# Patient Record
Sex: FEMALE | Race: WHITE | NOT HISPANIC OR LATINO | Employment: OTHER | URBAN - METROPOLITAN AREA
[De-identification: names, ages, dates, MRNs, and addresses within clinical notes are randomized per-mention and may not be internally consistent; named-entity substitution may affect disease eponyms.]

---

## 2023-06-30 LAB
ALANINE AMINOTRANSFERASE (SGPT) (U/L) IN SER/PLAS: 15 U/L (ref 7–45)
ALBUMIN (G/DL) IN SER/PLAS: 3.8 G/DL (ref 3.4–5)
ALBUMIN (MG/L) IN URINE: <7 MG/L
ALBUMIN/CREATININE (UG/MG) IN URINE: NORMAL UG/MG CRT (ref 0–30)
ALKALINE PHOSPHATASE (U/L) IN SER/PLAS: 91 U/L (ref 33–136)
ANION GAP IN SER/PLAS: 12 MMOL/L (ref 10–20)
ASPARTATE AMINOTRANSFERASE (SGOT) (U/L) IN SER/PLAS: 14 U/L (ref 9–39)
BILIRUBIN TOTAL (MG/DL) IN SER/PLAS: 0.5 MG/DL (ref 0–1.2)
CALCIUM (MG/DL) IN SER/PLAS: 8.8 MG/DL (ref 8.6–10.3)
CARBON DIOXIDE, TOTAL (MMOL/L) IN SER/PLAS: 31 MMOL/L (ref 21–32)
CHLORIDE (MMOL/L) IN SER/PLAS: 101 MMOL/L (ref 98–107)
CHOLESTEROL (MG/DL) IN SER/PLAS: 144 MG/DL (ref 0–199)
CHOLESTEROL IN HDL (MG/DL) IN SER/PLAS: 59.2 MG/DL
CHOLESTEROL/HDL RATIO: 2.4
CREATININE (MG/DL) IN SER/PLAS: 0.94 MG/DL (ref 0.5–1.05)
CREATININE (MG/DL) IN URINE: 49.5 MG/DL (ref 20–320)
GFR FEMALE: 66 ML/MIN/1.73M2
GLUCOSE (MG/DL) IN SER/PLAS: 87 MG/DL (ref 74–99)
LDL: 62 MG/DL (ref 0–99)
POTASSIUM (MMOL/L) IN SER/PLAS: 4.1 MMOL/L (ref 3.5–5.3)
PROTEIN TOTAL: 6.6 G/DL (ref 6.4–8.2)
SODIUM (MMOL/L) IN SER/PLAS: 140 MMOL/L (ref 136–145)
TRIGLYCERIDE (MG/DL) IN SER/PLAS: 116 MG/DL (ref 0–149)
UREA NITROGEN (MG/DL) IN SER/PLAS: 10 MG/DL (ref 6–23)
VLDL: 23 MG/DL (ref 0–40)

## 2023-07-01 LAB
ESTIMATED AVERAGE GLUCOSE FOR HBA1C: 143 MG/DL
HEMOGLOBIN A1C/HEMOGLOBIN TOTAL IN BLOOD: 6.6 %

## 2023-07-31 PROBLEM — E66.01 MORBID OBESITY (MULTI): Status: ACTIVE | Noted: 2023-07-31

## 2023-07-31 PROBLEM — I25.10 ATHEROSCLEROSIS OF NATIVE CORONARY ARTERY OF NATIVE HEART WITHOUT ANGINA PECTORIS: Status: ACTIVE | Noted: 2023-07-31

## 2023-07-31 PROBLEM — R93.1 ELEVATED CORONARY ARTERY CALCIUM SCORE: Status: ACTIVE | Noted: 2023-07-31

## 2023-07-31 PROBLEM — M79.18 MYOFASCIAL PAIN SYNDROME: Status: ACTIVE | Noted: 2023-07-31

## 2023-07-31 PROBLEM — M53.3 PAIN OF BOTH SACROILIAC JOINTS: Status: ACTIVE | Noted: 2023-07-31

## 2023-07-31 PROBLEM — I73.9 PVD (PERIPHERAL VASCULAR DISEASE) (CMS-HCC): Status: ACTIVE | Noted: 2023-07-31

## 2023-07-31 PROBLEM — M54.9 CHRONIC BACK PAIN GREATER THAN 3 MONTHS DURATION: Status: ACTIVE | Noted: 2023-07-31

## 2023-07-31 PROBLEM — I83.93 ASYMPTOMATIC VARICOSE VEINS OF BOTH LOWER EXTREMITIES: Status: ACTIVE | Noted: 2023-07-31

## 2023-07-31 PROBLEM — E53.8 VITAMIN B 12 DEFICIENCY: Status: ACTIVE | Noted: 2023-07-31

## 2023-07-31 PROBLEM — E55.9 VITAMIN D DEFICIENCY: Status: ACTIVE | Noted: 2023-07-31

## 2023-07-31 PROBLEM — E11.9 TYPE 2 DIABETES MELLITUS WITHOUT COMPLICATION, WITHOUT LONG-TERM CURRENT USE OF INSULIN (MULTI): Status: ACTIVE | Noted: 2023-07-31

## 2023-07-31 PROBLEM — G47.00 INSOMNIA: Status: ACTIVE | Noted: 2023-07-31

## 2023-07-31 PROBLEM — F32.0 MILD MAJOR DEPRESSION (CMS-HCC): Status: ACTIVE | Noted: 2023-07-31

## 2023-07-31 PROBLEM — I10 BENIGN ESSENTIAL HYPERTENSION: Status: ACTIVE | Noted: 2023-07-31

## 2023-07-31 PROBLEM — R93.1 ABNORMAL SCREENING CARDIAC CT: Status: ACTIVE | Noted: 2023-07-31

## 2023-07-31 PROBLEM — E66.09 OBESITY DUE TO EXCESS CALORIES, UNSPECIFIED OBESITY SEVERITY: Status: ACTIVE | Noted: 2023-07-31

## 2023-07-31 PROBLEM — F43.21 GRIEF: Status: ACTIVE | Noted: 2023-07-31

## 2023-07-31 PROBLEM — G89.29 CHRONIC BACK PAIN GREATER THAN 3 MONTHS DURATION: Status: ACTIVE | Noted: 2023-07-31

## 2023-07-31 PROBLEM — R11.0 NAUSEA IN ADULT: Status: ACTIVE | Noted: 2023-07-31

## 2023-07-31 PROBLEM — E03.9 HYPOTHYROIDISM IN ADULT: Status: ACTIVE | Noted: 2023-07-31

## 2023-07-31 PROBLEM — F41.9 ANXIETY: Status: ACTIVE | Noted: 2023-07-31

## 2023-07-31 PROBLEM — E78.5 HYPERLIPIDEMIA ASSOCIATED WITH TYPE 2 DIABETES MELLITUS (MULTI): Status: ACTIVE | Noted: 2023-07-31

## 2023-07-31 PROBLEM — R00.2 PALPITATIONS: Status: ACTIVE | Noted: 2023-07-31

## 2023-07-31 PROBLEM — E11.69 HYPERLIPIDEMIA ASSOCIATED WITH TYPE 2 DIABETES MELLITUS (MULTI): Status: ACTIVE | Noted: 2023-07-31

## 2023-07-31 RX ORDER — METFORMIN HYDROCHLORIDE 750 MG/1
750 TABLET, EXTENDED RELEASE ORAL DAILY
COMMUNITY
End: 2023-08-04 | Stop reason: SDUPTHER

## 2023-07-31 RX ORDER — OMEPRAZOLE 40 MG/1
1 CAPSULE, DELAYED RELEASE ORAL DAILY
COMMUNITY
Start: 2016-09-01 | End: 2024-01-26 | Stop reason: SDUPTHER

## 2023-07-31 RX ORDER — BUPROPION HYDROCHLORIDE 150 MG/1
150 TABLET ORAL EVERY 24 HOURS
COMMUNITY
End: 2023-08-04 | Stop reason: ALTCHOICE

## 2023-07-31 RX ORDER — TIRZEPATIDE 2.5 MG/.5ML
INJECTION, SOLUTION SUBCUTANEOUS
COMMUNITY
Start: 2023-07-05 | End: 2023-08-04 | Stop reason: ALTCHOICE

## 2023-07-31 RX ORDER — BLOOD SUGAR DIAGNOSTIC
STRIP MISCELLANEOUS
COMMUNITY

## 2023-07-31 RX ORDER — NAPROXEN SODIUM 220 MG/1
1 TABLET, FILM COATED ORAL DAILY
COMMUNITY
Start: 2020-10-07

## 2023-07-31 RX ORDER — ATORVASTATIN CALCIUM 40 MG/1
40 TABLET, FILM COATED ORAL NIGHTLY
COMMUNITY
End: 2023-08-04 | Stop reason: SDUPTHER

## 2023-07-31 RX ORDER — CARVEDILOL 12.5 MG/1
1 TABLET ORAL
COMMUNITY
Start: 2017-07-27 | End: 2024-04-02 | Stop reason: SDUPTHER

## 2023-07-31 RX ORDER — HYDROCHLOROTHIAZIDE 12.5 MG/1
12.5 TABLET ORAL DAILY
COMMUNITY
End: 2023-08-04 | Stop reason: SDUPTHER

## 2023-07-31 RX ORDER — LOSARTAN POTASSIUM 100 MG/1
100 TABLET ORAL DAILY
COMMUNITY
End: 2023-09-07 | Stop reason: SDUPTHER

## 2023-07-31 RX ORDER — POTASSIUM CHLORIDE 1500 MG/1
20 TABLET, EXTENDED RELEASE ORAL DAILY
COMMUNITY
End: 2023-08-04 | Stop reason: ALTCHOICE

## 2023-07-31 RX ORDER — BUPROPION HYDROCHLORIDE 300 MG/1
40 TABLET ORAL DAILY
COMMUNITY
End: 2023-08-04 | Stop reason: SDUPTHER

## 2023-07-31 RX ORDER — BUSPIRONE HYDROCHLORIDE 7.5 MG/1
7.5 TABLET ORAL AS NEEDED
COMMUNITY
Start: 2023-01-27 | End: 2024-04-01 | Stop reason: SDUPTHER

## 2023-07-31 RX ORDER — ACETAMINOPHEN 500 MG
1 TABLET ORAL DAILY
COMMUNITY
Start: 2017-07-27 | End: 2023-08-04 | Stop reason: SDUPTHER

## 2023-08-01 LAB
CALCIDIOL (25 OH VITAMIN D3) (NG/ML) IN SER/PLAS: 78 NG/ML
COBALAMIN (VITAMIN B12) (PG/ML) IN SER/PLAS: 183 PG/ML (ref 211–911)
ERYTHROCYTE DISTRIBUTION WIDTH (RATIO) BY AUTOMATED COUNT: 14.6 % (ref 11.5–14.5)
ERYTHROCYTE MEAN CORPUSCULAR HEMOGLOBIN CONCENTRATION (G/DL) BY AUTOMATED: 32.2 G/DL (ref 32–36)
ERYTHROCYTE MEAN CORPUSCULAR VOLUME (FL) BY AUTOMATED COUNT: 89 FL (ref 80–100)
ERYTHROCYTES (10*6/UL) IN BLOOD BY AUTOMATED COUNT: 4.06 X10E12/L (ref 4–5.2)
HEMATOCRIT (%) IN BLOOD BY AUTOMATED COUNT: 36.3 % (ref 36–46)
HEMOGLOBIN (G/DL) IN BLOOD: 11.7 G/DL (ref 12–16)
LEUKOCYTES (10*3/UL) IN BLOOD BY AUTOMATED COUNT: 6.1 X10E9/L (ref 4.4–11.3)
PLATELETS (10*3/UL) IN BLOOD AUTOMATED COUNT: 325 X10E9/L (ref 150–450)
THYROTROPIN (MIU/L) IN SER/PLAS BY DETECTION LIMIT <= 0.05 MIU/L: 8.28 MIU/L (ref 0.44–3.98)
THYROXINE (T4) FREE (NG/DL) IN SER/PLAS: 1.06 NG/DL (ref 0.61–1.12)

## 2023-08-04 ENCOUNTER — OFFICE VISIT (OUTPATIENT)
Dept: PRIMARY CARE | Facility: CLINIC | Age: 68
End: 2023-08-04
Payer: MEDICARE

## 2023-08-04 VITALS
DIASTOLIC BLOOD PRESSURE: 68 MMHG | BODY MASS INDEX: 43 KG/M2 | HEART RATE: 76 BPM | SYSTOLIC BLOOD PRESSURE: 102 MMHG | WEIGHT: 274 LBS | HEIGHT: 67 IN

## 2023-08-04 DIAGNOSIS — I73.9 PVD (PERIPHERAL VASCULAR DISEASE) (CMS-HCC): ICD-10-CM

## 2023-08-04 DIAGNOSIS — E66.01 MORBID OBESITY (MULTI): ICD-10-CM

## 2023-08-04 DIAGNOSIS — E53.8 VITAMIN B 12 DEFICIENCY: ICD-10-CM

## 2023-08-04 DIAGNOSIS — E03.9 HYPOTHYROIDISM IN ADULT: Primary | ICD-10-CM

## 2023-08-04 DIAGNOSIS — I10 BENIGN ESSENTIAL HYPERTENSION: ICD-10-CM

## 2023-08-04 DIAGNOSIS — Z23 NEED FOR PNEUMOCOCCAL 20-VALENT CONJUGATE VACCINATION: ICD-10-CM

## 2023-08-04 DIAGNOSIS — E11.69 HYPERLIPIDEMIA ASSOCIATED WITH TYPE 2 DIABETES MELLITUS (MULTI): ICD-10-CM

## 2023-08-04 DIAGNOSIS — F32.0 MILD MAJOR DEPRESSION (CMS-HCC): ICD-10-CM

## 2023-08-04 DIAGNOSIS — F41.9 ANXIETY: ICD-10-CM

## 2023-08-04 DIAGNOSIS — E11.9 TYPE 2 DIABETES MELLITUS WITHOUT COMPLICATION, WITHOUT LONG-TERM CURRENT USE OF INSULIN (MULTI): ICD-10-CM

## 2023-08-04 DIAGNOSIS — R11.0 NAUSEA: ICD-10-CM

## 2023-08-04 DIAGNOSIS — E55.9 VITAMIN D DEFICIENCY: ICD-10-CM

## 2023-08-04 DIAGNOSIS — E78.5 HYPERLIPIDEMIA ASSOCIATED WITH TYPE 2 DIABETES MELLITUS (MULTI): ICD-10-CM

## 2023-08-04 PROBLEM — E66.09 OBESITY DUE TO EXCESS CALORIES, UNSPECIFIED OBESITY SEVERITY: Status: RESOLVED | Noted: 2023-07-31 | Resolved: 2023-08-04

## 2023-08-04 PROCEDURE — 1036F TOBACCO NON-USER: CPT | Performed by: CLINICAL NURSE SPECIALIST

## 2023-08-04 PROCEDURE — 3008F BODY MASS INDEX DOCD: CPT | Performed by: CLINICAL NURSE SPECIALIST

## 2023-08-04 PROCEDURE — G0009 ADMIN PNEUMOCOCCAL VACCINE: HCPCS | Performed by: CLINICAL NURSE SPECIALIST

## 2023-08-04 PROCEDURE — 3078F DIAST BP <80 MM HG: CPT | Performed by: CLINICAL NURSE SPECIALIST

## 2023-08-04 PROCEDURE — 1160F RVW MEDS BY RX/DR IN RCRD: CPT | Performed by: CLINICAL NURSE SPECIALIST

## 2023-08-04 PROCEDURE — 4010F ACE/ARB THERAPY RXD/TAKEN: CPT | Performed by: CLINICAL NURSE SPECIALIST

## 2023-08-04 PROCEDURE — 96372 THER/PROPH/DIAG INJ SC/IM: CPT | Performed by: CLINICAL NURSE SPECIALIST

## 2023-08-04 PROCEDURE — 99214 OFFICE O/P EST MOD 30 MIN: CPT | Performed by: CLINICAL NURSE SPECIALIST

## 2023-08-04 PROCEDURE — 1159F MED LIST DOCD IN RCRD: CPT | Performed by: CLINICAL NURSE SPECIALIST

## 2023-08-04 PROCEDURE — 3044F HG A1C LEVEL LT 7.0%: CPT | Performed by: CLINICAL NURSE SPECIALIST

## 2023-08-04 PROCEDURE — 90677 PCV20 VACCINE IM: CPT | Performed by: CLINICAL NURSE SPECIALIST

## 2023-08-04 PROCEDURE — 3074F SYST BP LT 130 MM HG: CPT | Performed by: CLINICAL NURSE SPECIALIST

## 2023-08-04 PROCEDURE — 1157F ADVNC CARE PLAN IN RCRD: CPT | Performed by: CLINICAL NURSE SPECIALIST

## 2023-08-04 RX ORDER — LEVOTHYROXINE SODIUM 137 UG/1
137 TABLET ORAL DAILY
Qty: 30 TABLET | Refills: 11 | Status: SHIPPED | OUTPATIENT
Start: 2023-08-04 | End: 2023-11-07 | Stop reason: SDUPTHER

## 2023-08-04 RX ORDER — CYANOCOBALAMIN 1000 UG/ML
1000 INJECTION, SOLUTION INTRAMUSCULAR; SUBCUTANEOUS ONCE
Status: COMPLETED | OUTPATIENT
Start: 2023-08-04 | End: 2023-08-04

## 2023-08-04 RX ORDER — BUPROPION HYDROCHLORIDE 300 MG/1
300 TABLET ORAL DAILY
Qty: 90 TABLET | Refills: 1 | Status: SHIPPED | OUTPATIENT
Start: 2023-08-04 | End: 2023-11-07 | Stop reason: SDUPTHER

## 2023-08-04 RX ORDER — ONDANSETRON 4 MG/1
4 TABLET, FILM COATED ORAL EVERY 8 HOURS PRN
Qty: 20 TABLET | Refills: 0 | Status: SHIPPED | OUTPATIENT
Start: 2023-08-04 | End: 2023-08-11

## 2023-08-04 RX ORDER — METFORMIN HYDROCHLORIDE 750 MG/1
750 TABLET, EXTENDED RELEASE ORAL DAILY
Qty: 90 TABLET | Refills: 1 | Status: SHIPPED | OUTPATIENT
Start: 2023-08-04 | End: 2024-02-28 | Stop reason: SDUPTHER

## 2023-08-04 RX ORDER — ACETAMINOPHEN 500 MG
5000 TABLET ORAL DAILY
Qty: 90 TABLET | Refills: 1 | Status: SHIPPED | OUTPATIENT
Start: 2023-08-04 | End: 2024-01-31

## 2023-08-04 RX ORDER — HYDROCHLOROTHIAZIDE 12.5 MG/1
12.5 TABLET ORAL DAILY
Qty: 90 TABLET | Refills: 1 | Status: SHIPPED | OUTPATIENT
Start: 2023-08-04 | End: 2024-02-05 | Stop reason: SDUPTHER

## 2023-08-04 RX ORDER — ATORVASTATIN CALCIUM 40 MG/1
40 TABLET, FILM COATED ORAL NIGHTLY
Qty: 90 TABLET | Refills: 1 | Status: SHIPPED | OUTPATIENT
Start: 2023-08-04 | End: 2024-01-26 | Stop reason: SDUPTHER

## 2023-08-04 RX ORDER — DULAGLUTIDE 0.75 MG/.5ML
0.75 INJECTION, SOLUTION SUBCUTANEOUS
Qty: 2 ML | Refills: 1 | Status: SHIPPED | OUTPATIENT
Start: 2023-08-04 | End: 2023-11-01 | Stop reason: SDUPTHER

## 2023-08-04 RX ADMIN — CYANOCOBALAMIN 1000 MCG: 1000 INJECTION, SOLUTION INTRAMUSCULAR; SUBCUTANEOUS at 11:29

## 2023-08-04 ASSESSMENT — ENCOUNTER SYMPTOMS
CONFUSION: 0
PALPITATIONS: 0
ACTIVITY CHANGE: 0
POLYDIPSIA: 0
ARTHRALGIAS: 0
SORE THROAT: 0
DIARRHEA: 0
VOMITING: 0
LOSS OF SENSATION IN FEET: 0
FATIGUE: 0
JOINT SWELLING: 0
CHILLS: 0
TROUBLE SWALLOWING: 0
ABDOMINAL PAIN: 0
CHEST TIGHTNESS: 0
BRUISES/BLEEDS EASILY: 0
HEMATURIA: 0
DIZZINESS: 0
COUGH: 0
SLEEP DISTURBANCE: 0
DYSURIA: 0
NECK PAIN: 0
FEVER: 0
HEADACHES: 0
EYE PAIN: 0
OCCASIONAL FEELINGS OF UNSTEADINESS: 0
MYALGIAS: 0
SHORTNESS OF BREATH: 0
FLANK PAIN: 0
NAUSEA: 0
PHOTOPHOBIA: 0
SEIZURES: 0
DEPRESSION: 0
WOUND: 0
BACK PAIN: 0
BLOOD IN STOOL: 0
WHEEZING: 0
UNEXPECTED WEIGHT CHANGE: 0
APPETITE CHANGE: 0
CONSTIPATION: 0

## 2023-08-04 ASSESSMENT — COLUMBIA-SUICIDE SEVERITY RATING SCALE - C-SSRS
2. HAVE YOU ACTUALLY HAD ANY THOUGHTS OF KILLING YOURSELF?: NO
6. HAVE YOU EVER DONE ANYTHING, STARTED TO DO ANYTHING, OR PREPARED TO DO ANYTHING TO END YOUR LIFE?: NO
1. IN THE PAST MONTH, HAVE YOU WISHED YOU WERE DEAD OR WISHED YOU COULD GO TO SLEEP AND NOT WAKE UP?: NO

## 2023-08-04 ASSESSMENT — PATIENT HEALTH QUESTIONNAIRE - PHQ9
1. LITTLE INTEREST OR PLEASURE IN DOING THINGS: NOT AT ALL
SUM OF ALL RESPONSES TO PHQ9 QUESTIONS 1 AND 2: 0
2. FEELING DOWN, DEPRESSED OR HOPELESS: NOT AT ALL

## 2023-08-04 NOTE — PROGRESS NOTES
Subjective   Patient ID: Elizabet Butler is a 67 y.o. female who presents for Follow-up (Follow up).  HPI    Here today as a follow up appointment.     Thyroid has been uncontrolled. Would like to change from the Compounded medication due to cost as it is not controlled at this time.      The patient states she has been doing well with her Type II Diabetes control since the last visit. Insurance did not cover Mounjaro. Did not tolerate Ozempic. Requesting to try Trulicity.     Comorbid Illnesses: hypertension, hyperlipidemia and obesity.   Disease Course and Complications:. there have been no previous episodes of diabetic ketoacidosis. there have been no previous hospitalizations. She has no known diabetic complications. She has no significant interval events.     Following with [facility]. Following with Cardiology.     Increased stressors. Interested in adjusting her medications. Has continued on Amitriptyline and Wellbutrin.   Migraines have been controlled.     Review of Systems   Constitutional:  Negative for activity change, appetite change, chills, fatigue, fever and unexpected weight change.   HENT:  Negative for ear pain, hearing loss, nosebleeds, sore throat, tinnitus and trouble swallowing.    Eyes:  Negative for photophobia, pain and visual disturbance.   Respiratory:  Negative for cough, chest tightness, shortness of breath and wheezing.    Cardiovascular:  Negative for chest pain, palpitations and leg swelling.   Gastrointestinal:  Negative for abdominal pain, blood in stool, constipation, diarrhea, nausea and vomiting.   Endocrine: Negative for cold intolerance, heat intolerance, polydipsia and polyuria.   Genitourinary:  Negative for dysuria, flank pain and hematuria.   Musculoskeletal:  Negative for arthralgias, back pain, joint swelling, myalgias and neck pain.   Skin:  Negative for pallor, rash and wound.   Allergic/Immunologic: Negative for immunocompromised state.   Neurological:  Negative for  dizziness, seizures and headaches.   Hematological:  Does not bruise/bleed easily.   Psychiatric/Behavioral:  Negative for confusion and sleep disturbance.        Objective   Physical Exam  Vitals and nursing note reviewed.   Constitutional:       General: She is not in acute distress.     Appearance: Normal appearance.   HENT:      Head: Normocephalic.      Nose: Nose normal.   Eyes:      Conjunctiva/sclera: Conjunctivae normal.   Neck:      Vascular: No carotid bruit.   Cardiovascular:      Rate and Rhythm: Normal rate and regular rhythm.      Pulses: Normal pulses.      Heart sounds: Normal heart sounds.   Pulmonary:      Effort: Pulmonary effort is normal.      Breath sounds: Normal breath sounds.   Abdominal:      General: Bowel sounds are normal.      Palpations: Abdomen is soft.   Musculoskeletal:         General: Normal range of motion.      Cervical back: Normal range of motion.   Skin:     General: Skin is warm and dry.   Neurological:      Mental Status: She is alert and oriented to person, place, and time. Mental status is at baseline.   Psychiatric:         Mood and Affect: Mood normal.         Behavior: Behavior normal.         Assessment/Plan        Reviewed most recent lab work available.     Hypothyroidism. Start Levothyroxine. Updated lab work ordered.   Abnormal Calcium score. She had normal stress test in the summer of 2017. She has been seen by cardiology and is now on a statin, baby aspirin a beta blocker. She is asymptomatic. Yearly follow-up with cardiology.   PVD: Stable at this time. continue to monitor.   Hypertension. Blood pressures normally well controlled. Will continue current medication. Patient previously discontinued ARB secondary to dizziness.   Insomnia, Anxiety, Depression, History of migraine variant. Doing well continue carvedilol and amitriptyline. Increased Amitriptyline with increased Depression. Continue Wellbutrin. Buspirone. Reevaluate at follow up appointment.    Diabetes mellitus type 2: A1C 6.6%. Did not tolerate Ozempic. Initially continue Metformin, monitor blood sugars to discontinue. Continue to monitor hemoglobin A1c. Urine microalbumin has previously been negative. ARB discontinued by patient as above secondary to dizziness. No diabetic complications. Diabetic eye exam 2022, patient reminded to schedule this years exam. Trulicity.   Mild dyslipidemia. Continue atorvastatin 40 mg daily. Repeat lipid panel yearly is ordered per cardiology.  Obesity: BMI: 42.91. Lifestyle changes as noted above.  Vitamin B12 Deficiency: B12 injections.     No further Pap smears required secondary to age.   DEXA scan May 2019. Would like to hold off on this time.   Normal mammogram January 2023, negative.   Patient refuses colonoscopy.   Negative Cologuard August 2020, repeat in 3 years. Ordered for 2023.   Prevnar 20: August 2023.  Pneumovax: September 2019.   Flu Vaccine: Fall 2022.  Shingrix: October 2020, March 2021.   Medicare Wellness: January 2023.   Discussed Tdap.

## 2023-08-21 ENCOUNTER — TELEPHONE (OUTPATIENT)
Dept: PRIMARY CARE | Facility: CLINIC | Age: 68
End: 2023-08-21
Payer: MEDICARE

## 2023-08-21 ENCOUNTER — PATIENT MESSAGE (OUTPATIENT)
Dept: PRIMARY CARE | Facility: CLINIC | Age: 68
End: 2023-08-21
Payer: MEDICARE

## 2023-08-21 DIAGNOSIS — R11.0 NAUSEA: Primary | ICD-10-CM

## 2023-08-21 NOTE — TELEPHONE ENCOUNTER
[employer] called---diagnosis on order for Cologuard are not covered    Asking if either one of these codes would be okay ?  They will take verbal okay: Ref #[id number]--[facility] # [phone]  Z12.11 Screening Malignant neoplasm colon  Z12.12 Screening Malignant

## 2023-08-22 RX ORDER — ONDANSETRON 4 MG/1
4 TABLET, FILM COATED ORAL EVERY 8 HOURS PRN
Qty: 21 TABLET | Refills: 1 | Status: SHIPPED | OUTPATIENT
Start: 2023-08-22 | End: 2023-08-29

## 2023-09-07 ENCOUNTER — TELEPHONE (OUTPATIENT)
Dept: PRIMARY CARE | Facility: CLINIC | Age: 68
End: 2023-09-07
Payer: MEDICARE

## 2023-09-07 DIAGNOSIS — I10 BENIGN ESSENTIAL HYPERTENSION: ICD-10-CM

## 2023-09-07 RX ORDER — LOSARTAN POTASSIUM 100 MG/1
100 TABLET ORAL DAILY
Qty: 90 TABLET | Refills: 1 | Status: SHIPPED | OUTPATIENT
Start: 2023-09-07 | End: 2024-02-28 | Stop reason: SDUPTHER

## 2023-09-18 LAB — NONINV COLON CA DNA+OCC BLD SCRN STL QL: POSITIVE

## 2023-09-19 ENCOUNTER — TELEPHONE (OUTPATIENT)
Dept: PRIMARY CARE | Facility: CLINIC | Age: 68
End: 2023-09-19
Payer: MEDICARE

## 2023-09-19 DIAGNOSIS — R19.5 POSITIVE COLORECTAL CANCER SCREENING USING COLOGUARD TEST: ICD-10-CM

## 2023-10-13 ENCOUNTER — TELEPHONE (OUTPATIENT)
Dept: PRIMARY CARE | Facility: CLINIC | Age: 68
End: 2023-10-13
Payer: MEDICARE

## 2023-10-13 DIAGNOSIS — F32.A DEPRESSION, UNSPECIFIED DEPRESSION TYPE: ICD-10-CM

## 2023-10-16 RX ORDER — AMITRIPTYLINE HYDROCHLORIDE 10 MG/1
30 TABLET, FILM COATED ORAL NIGHTLY
Qty: 270 TABLET | Refills: 1 | Status: SHIPPED | OUTPATIENT
Start: 2023-10-16 | End: 2024-04-04 | Stop reason: SDUPTHER

## 2023-10-24 DIAGNOSIS — E11.9 TYPE 2 DIABETES MELLITUS WITHOUT COMPLICATION, WITHOUT LONG-TERM CURRENT USE OF INSULIN (MULTI): Primary | ICD-10-CM

## 2023-10-30 ENCOUNTER — LAB (OUTPATIENT)
Dept: LAB | Facility: LAB | Age: 68
End: 2023-10-30
Payer: MEDICARE

## 2023-10-30 DIAGNOSIS — E11.69 HYPERLIPIDEMIA ASSOCIATED WITH TYPE 2 DIABETES MELLITUS (MULTI): ICD-10-CM

## 2023-10-30 DIAGNOSIS — I10 BENIGN ESSENTIAL HYPERTENSION: ICD-10-CM

## 2023-10-30 DIAGNOSIS — I73.9 PVD (PERIPHERAL VASCULAR DISEASE) (CMS-HCC): ICD-10-CM

## 2023-10-30 DIAGNOSIS — E66.01 MORBID OBESITY (MULTI): ICD-10-CM

## 2023-10-30 DIAGNOSIS — E03.9 HYPOTHYROIDISM IN ADULT: ICD-10-CM

## 2023-10-30 DIAGNOSIS — E78.5 HYPERLIPIDEMIA ASSOCIATED WITH TYPE 2 DIABETES MELLITUS (MULTI): ICD-10-CM

## 2023-10-30 DIAGNOSIS — E55.9 VITAMIN D DEFICIENCY: ICD-10-CM

## 2023-10-30 DIAGNOSIS — F32.0 MILD MAJOR DEPRESSION (CMS-HCC): ICD-10-CM

## 2023-10-30 DIAGNOSIS — F41.9 ANXIETY: ICD-10-CM

## 2023-10-30 DIAGNOSIS — E11.9 TYPE 2 DIABETES MELLITUS WITHOUT COMPLICATION, WITHOUT LONG-TERM CURRENT USE OF INSULIN (MULTI): ICD-10-CM

## 2023-10-30 LAB
ALBUMIN SERPL BCP-MCNC: 4.2 G/DL (ref 3.4–5)
ALP SERPL-CCNC: 101 U/L (ref 33–136)
ALT SERPL W P-5'-P-CCNC: 23 U/L (ref 7–45)
ANION GAP SERPL CALC-SCNC: 11 MMOL/L (ref 10–20)
AST SERPL W P-5'-P-CCNC: 18 U/L (ref 9–39)
BILIRUB SERPL-MCNC: 0.6 MG/DL (ref 0–1.2)
BUN SERPL-MCNC: 14 MG/DL (ref 6–23)
CALCIUM SERPL-MCNC: 9.5 MG/DL (ref 8.6–10.3)
CHLORIDE SERPL-SCNC: 99 MMOL/L (ref 98–107)
CHOLEST SERPL-MCNC: 167 MG/DL (ref 0–199)
CHOLESTEROL/HDL RATIO: 2.5
CO2 SERPL-SCNC: 31 MMOL/L (ref 21–32)
CREAT SERPL-MCNC: 1.16 MG/DL (ref 0.5–1.05)
CREAT UR-MCNC: 46.7 MG/DL (ref 20–320)
GFR SERPL CREATININE-BSD FRML MDRD: 51 ML/MIN/1.73M*2
GLUCOSE SERPL-MCNC: 83 MG/DL (ref 74–99)
HDLC SERPL-MCNC: 67.4 MG/DL
LDLC SERPL CALC-MCNC: 82 MG/DL
MICROALBUMIN UR-MCNC: <7 MG/L
MICROALBUMIN/CREAT UR: NORMAL MG/G{CREAT}
NON HDL CHOLESTEROL: 100 MG/DL (ref 0–149)
POTASSIUM SERPL-SCNC: 4.3 MMOL/L (ref 3.5–5.3)
PROT SERPL-MCNC: 7 G/DL (ref 6.4–8.2)
SODIUM SERPL-SCNC: 137 MMOL/L (ref 136–145)
TRIGL SERPL-MCNC: 90 MG/DL (ref 0–149)
TSH SERPL-ACNC: 1.62 MIU/L (ref 0.44–3.98)
VIT B12 SERPL-MCNC: 205 PG/ML (ref 211–911)
VLDL: 18 MG/DL (ref 0–40)

## 2023-10-30 PROCEDURE — 80053 COMPREHEN METABOLIC PANEL: CPT

## 2023-10-30 PROCEDURE — 36415 COLL VENOUS BLD VENIPUNCTURE: CPT

## 2023-10-30 PROCEDURE — 82570 ASSAY OF URINE CREATININE: CPT

## 2023-10-30 PROCEDURE — 84443 ASSAY THYROID STIM HORMONE: CPT

## 2023-10-30 PROCEDURE — 80061 LIPID PANEL: CPT

## 2023-10-30 PROCEDURE — 82043 UR ALBUMIN QUANTITATIVE: CPT

## 2023-10-30 PROCEDURE — 82607 VITAMIN B-12: CPT

## 2023-10-30 PROCEDURE — 83036 HEMOGLOBIN GLYCOSYLATED A1C: CPT

## 2023-10-31 LAB
EST. AVERAGE GLUCOSE BLD GHB EST-MCNC: 128 MG/DL
HBA1C MFR BLD: 6.1 %

## 2023-11-01 ENCOUNTER — OFFICE VISIT (OUTPATIENT)
Dept: CARDIOLOGY | Facility: CLINIC | Age: 68
End: 2023-11-01
Payer: MEDICARE

## 2023-11-01 VITALS
HEART RATE: 80 BPM | DIASTOLIC BLOOD PRESSURE: 85 MMHG | RESPIRATION RATE: 18 BRPM | SYSTOLIC BLOOD PRESSURE: 130 MMHG | BODY MASS INDEX: 41.12 KG/M2 | HEIGHT: 67 IN | OXYGEN SATURATION: 99 % | WEIGHT: 262 LBS

## 2023-11-01 DIAGNOSIS — E78.5 HYPERLIPIDEMIA ASSOCIATED WITH TYPE 2 DIABETES MELLITUS (MULTI): ICD-10-CM

## 2023-11-01 DIAGNOSIS — E66.01 MORBID OBESITY (MULTI): ICD-10-CM

## 2023-11-01 DIAGNOSIS — E11.69 HYPERLIPIDEMIA ASSOCIATED WITH TYPE 2 DIABETES MELLITUS (MULTI): ICD-10-CM

## 2023-11-01 DIAGNOSIS — I10 BENIGN ESSENTIAL HYPERTENSION: ICD-10-CM

## 2023-11-01 DIAGNOSIS — E11.9 TYPE 2 DIABETES MELLITUS WITHOUT COMPLICATION, WITHOUT LONG-TERM CURRENT USE OF INSULIN (MULTI): ICD-10-CM

## 2023-11-01 DIAGNOSIS — R93.1 ELEVATED CORONARY ARTERY CALCIUM SCORE: Primary | ICD-10-CM

## 2023-11-01 PROCEDURE — 3008F BODY MASS INDEX DOCD: CPT | Performed by: INTERNAL MEDICINE

## 2023-11-01 PROCEDURE — 4010F ACE/ARB THERAPY RXD/TAKEN: CPT | Performed by: INTERNAL MEDICINE

## 2023-11-01 PROCEDURE — 99214 OFFICE O/P EST MOD 30 MIN: CPT | Performed by: INTERNAL MEDICINE

## 2023-11-01 PROCEDURE — 1159F MED LIST DOCD IN RCRD: CPT | Performed by: INTERNAL MEDICINE

## 2023-11-01 PROCEDURE — 3075F SYST BP GE 130 - 139MM HG: CPT | Performed by: INTERNAL MEDICINE

## 2023-11-01 PROCEDURE — 3044F HG A1C LEVEL LT 7.0%: CPT | Performed by: INTERNAL MEDICINE

## 2023-11-01 PROCEDURE — 3062F POS MACROALBUMINURIA REV: CPT | Performed by: INTERNAL MEDICINE

## 2023-11-01 PROCEDURE — 3048F LDL-C <100 MG/DL: CPT | Performed by: INTERNAL MEDICINE

## 2023-11-01 PROCEDURE — 1160F RVW MEDS BY RX/DR IN RCRD: CPT | Performed by: INTERNAL MEDICINE

## 2023-11-01 PROCEDURE — 1036F TOBACCO NON-USER: CPT | Performed by: INTERNAL MEDICINE

## 2023-11-01 PROCEDURE — 3079F DIAST BP 80-89 MM HG: CPT | Performed by: INTERNAL MEDICINE

## 2023-11-01 PROCEDURE — 1126F AMNT PAIN NOTED NONE PRSNT: CPT | Performed by: INTERNAL MEDICINE

## 2023-11-01 RX ORDER — DULAGLUTIDE 0.75 MG/.5ML
0.75 INJECTION, SOLUTION SUBCUTANEOUS
Qty: 6 ML | Refills: 3 | Status: SHIPPED | OUTPATIENT
Start: 2023-11-01 | End: 2024-01-26 | Stop reason: ALTCHOICE

## 2023-11-01 ASSESSMENT — ENCOUNTER SYMPTOMS
GASTROINTESTINAL NEGATIVE: 1
CARDIOVASCULAR NEGATIVE: 1
HEMATOLOGIC/LYMPHATIC NEGATIVE: 1
MUSCULOSKELETAL NEGATIVE: 1
RESPIRATORY NEGATIVE: 1
NEUROLOGICAL NEGATIVE: 1
ENDOCRINE NEGATIVE: 1
PSYCHIATRIC NEGATIVE: 1
CONSTITUTIONAL NEGATIVE: 1
ALLERGIC/IMMUNOLOGIC NEGATIVE: 1
EYES NEGATIVE: 1

## 2023-11-01 ASSESSMENT — PAIN SCALES - GENERAL: PAINLEVEL: 0-NO PAIN

## 2023-11-01 NOTE — PROGRESS NOTES
[facility] Vascular-Metabolic Disease ([facility]) Note    Reason for Visit: Diabetes and Hyperlipidemia.  Referring Clinician: No ref. provider found     History of Present Illness:  Mrs. Butler is a 67-year-old woman with multiple cardiovascular risk factors including hypertension, hyperlipidemia, type 2 diabetes mellitus on insulin, obesity, and elevated coronary artery calcium score referred by her primary cardiologist to the [facility] program for cardiovascular risk factor optimization and is here for a follow-up visit.  Since her last visit she has been doing well without any exertional chest pain, shortness of breath, palpitations, or syncope.  She could not afford the tirzepatide and the semaglutide caused her to have severe nausea so she went back to Trulicity 0.75 once weekly in addition to metformin with improvement in her hemoglobin A1c down to 6.1%.  She continues on her other medications without adverse effects.  Her blood pressure today in the office is acceptable at 130/85 mmHg.  Her most recent lipid panel shows well-controlled cholesterol at goal.      Past Medical History:  She has a past medical history of Anxiety disorder, unspecified, Body mass index (BMI) 34.0-34.9, adult (10/16/2019), Body mass index (BMI) 38.0-38.9, adult (09/21/2021), Body mass index (BMI) 39.0-39.9, adult (01/13/2021), Cellulitis of unspecified part of limb (01/13/2021), COVID-19 (01/13/2021), Major depressive disorder, single episode, in full remission (CMS/HCC) (01/13/2021), Major depressive disorder, single episode, moderate (CMS/HCC) (07/22/2022), Personal history of other diseases of the digestive system (08/18/2020), Personal history of other diseases of the musculoskeletal system and connective tissue, Personal history of other diseases of the nervous system and sense organs, Personal history of other endocrine, nutritional and metabolic disease, and Personal history of other specified  conditions.    Past Surgical History:  She has a past surgical history that includes  section, classic (2017) and Cholecystectomy (2017).    Social History:  She reports that she has never smoked. She has never used smokeless tobacco. She reports that she does not drink alcohol and does not use drugs.    Family History:  Family History   Problem Relation Name Age of Onset    Cancer Father      Hypertension Father      Heart disease Father         Allergies:  Patient has no known allergies.    Outpatient Medications:  Current Outpatient Medications   Medication Instructions    amitriptyline (ELAVIL) 30 mg, oral, Nightly    aspirin 81 mg chewable tablet 1 tablet, oral, Daily    atorvastatin (LIPITOR) 40 mg, oral, Nightly    buPROPion XL (WELLBUTRIN XL) 300 mg, oral, Daily    busPIRone (BUSPAR) 7.5 mg, oral, As needed    carvedilol (Coreg) 12.5 mg tablet 1 tablet, oral, 2 times daily with meals    cholecalciferol (VITAMIN D-3) 5,000 Units, oral, Daily    hydroCHLOROthiazide (HYDRODIURIL) 12.5 mg, oral, Daily    levothyroxine (SYNTHROID, LEVOXYL) 137 mcg, oral, Daily    losartan (COZAAR) 100 mg, oral, Daily    metFORMIN XR (GLUCOPHAGE-XR) 750 mg, oral, Daily, as directed    omeprazole (PriLOSEC) 40 mg DR capsule 1 capsule, oral, Daily    OneTouch Ultra Test strip USE 1 STRIP TO CHECK GLUCOSE TWICE DAILY    Trulicity 0.75 mg, subcutaneous, Weekly       Review of Systems:  Review of Systems   Constitutional: Negative.   HENT: Negative.     Eyes: Negative.    Cardiovascular: Negative.    Respiratory: Negative.     Endocrine: Negative.    Hematologic/Lymphatic: Negative.    Skin: Negative.    Musculoskeletal: Negative.    Gastrointestinal: Negative.    Genitourinary: Negative.    Neurological: Negative.    Psychiatric/Behavioral: Negative.     Allergic/Immunologic: Negative.        Last Recorded Vitals:  Vitals:    23 1122   BP: 130/85   BP Location: Left arm   Patient Position: Sitting   BP Cuff  "Size: Large adult   Pulse: 80   Resp: 18   SpO2: 99%   Weight: 119 kg (262 lb)   Height: 1.702 m (5' 7\")       Physical Examination:  General: Well appearing, well-nourished, in no acute distress.  HEENT: Normocephalic atraumatic, pupils equal and reactive to light, extraocular muscles intact, no conjunctival injection, oropharynx clear without exudates.  Neck: Normal carotid arterial pulses, no arterial bruits, no thyromegaly.  Cardiac: Regular rhythm and normal heart rate.  S1, S2 present and normal.  No murmurs, rubs, or gallops.  PMI is nondisplaced. Jugular venous pulsations are normal.  Pulmonary: Normal breath sounds, no increased work of breathing, no wheezes or crackles.  GI: Normal bowel sounds, abdominal aorta not enlarged, no hepatosplenomegaly, no abdominal bruits.  Lower extremities: No cyanosis, clubbing, or edema.  No xanthelasma present. Normal distal pulses.  Skin: Skin intact. No significant rashes or lesions present.  Neuro: Alert and oriented x 3, normal attention and cognition, no focal motor or sensory neurologic deficits.  Psych: Normal affect and mood.  Musculoskeletal: Normal gait normal muscle tone.    Laboratory Studies:  Lab Results   Component Value Date    GLUCOSE 83 10/30/2023    CALCIUM 9.5 10/30/2023     10/30/2023    K 4.3 10/30/2023    CO2 31 10/30/2023    CL 99 10/30/2023    BUN 14 10/30/2023    CREATININE 1.16 (H) 10/30/2023     Lab Results   Component Value Date    ALT 23 10/30/2023    AST 18 10/30/2023    ALKPHOS 101 10/30/2023    BILITOT 0.6 10/30/2023     Results from last 7 days   Lab Units 10/30/23  1554   CHOLESTEROL mg/dL 167   TRIGLYCERIDES mg/dL 90   HDL mg/dL 67.4     Lab Results   Component Value Date    CHOL 167 10/30/2023    CHOL 144 06/30/2023    CHOL 136 12/21/2022     Lab Results   Component Value Date    HDL 67.4 10/30/2023    HDL 59.2 06/30/2023    HDL 56.0 12/21/2022     Lab Results   Component Value Date    LDLCALC 82 10/30/2023     Lab Results " "  Component Value Date    TRIG 90 10/30/2023    TRIG 116 06/30/2023    TRIG 94 12/21/2022     No components found for: \"CHOLHDL\"  Lab Results   Component Value Date    HGBA1C 6.1 (H) 10/30/2023     UACR <30      Assessment and Plan:  Problem List Items Addressed This Visit          Cardiac and Vasculature    Benign essential hypertension    Elevated coronary artery calcium score - Primary    Hyperlipidemia associated with type 2 diabetes mellitus (CMS/HCC)       Endocrine/Metabolic    Morbid obesity (CMS/HCC)    Type 2 diabetes mellitus without complication, without long-term current use of insulin (CMS/[facility])    Relevant Medications    dulaglutide (Trulicity) 0.75 mg/0.5 mL pen injector     Mrs. Butler is a 67-year-old woman with multiple cardiovascular risk factors including hypertension, hyperlipidemia, type 2 diabetes mellitus on insulin, obesity, and elevated coronary artery calcium score referred by her primary cardiologist to the [facility] program for cardiovascular risk factor optimization and is here for a follow-up visit.  She is doing well without any cardiovascular symptoms.  Her blood pressure, lipids, A1c, and UACR are all at goal.  I would not recommend any changes to her medical regimen today.  She will follow-up with her primary care physician and I will see her again in 6 months here in the office for routine follow-up.    I spent 35 minutes of face-to-face time with the patient, with more than 50% of that time spent in counseling and/or coordination of care.    Praveen Marcus MD, SARA, [facility]  Director,  [facility]ovascular [facility]  Director,  [facility] Program  Associate Professor of Medicine  [employer]      "

## 2023-11-07 ENCOUNTER — OFFICE VISIT (OUTPATIENT)
Dept: PRIMARY CARE | Facility: CLINIC | Age: 68
End: 2023-11-07
Payer: MEDICARE

## 2023-11-07 VITALS
SYSTOLIC BLOOD PRESSURE: 122 MMHG | HEART RATE: 80 BPM | WEIGHT: 269 LBS | DIASTOLIC BLOOD PRESSURE: 72 MMHG | BODY MASS INDEX: 42.22 KG/M2 | HEIGHT: 67 IN

## 2023-11-07 DIAGNOSIS — F32.0 MILD MAJOR DEPRESSION (CMS-HCC): ICD-10-CM

## 2023-11-07 DIAGNOSIS — Z12.31 VISIT FOR SCREENING MAMMOGRAM: ICD-10-CM

## 2023-11-07 DIAGNOSIS — E03.9 HYPOTHYROIDISM IN ADULT: ICD-10-CM

## 2023-11-07 DIAGNOSIS — E53.8 VITAMIN B12 DEFICIENCY: Primary | ICD-10-CM

## 2023-11-07 DIAGNOSIS — E78.5 HYPERLIPIDEMIA ASSOCIATED WITH TYPE 2 DIABETES MELLITUS (MULTI): ICD-10-CM

## 2023-11-07 DIAGNOSIS — E55.9 VITAMIN D DEFICIENCY: ICD-10-CM

## 2023-11-07 DIAGNOSIS — E11.9 TYPE 2 DIABETES MELLITUS WITHOUT COMPLICATION, WITHOUT LONG-TERM CURRENT USE OF INSULIN (MULTI): ICD-10-CM

## 2023-11-07 DIAGNOSIS — E11.69 HYPERLIPIDEMIA ASSOCIATED WITH TYPE 2 DIABETES MELLITUS (MULTI): ICD-10-CM

## 2023-11-07 DIAGNOSIS — I73.9 PVD (PERIPHERAL VASCULAR DISEASE) (CMS-HCC): ICD-10-CM

## 2023-11-07 DIAGNOSIS — I10 BENIGN ESSENTIAL HYPERTENSION: ICD-10-CM

## 2023-11-07 DIAGNOSIS — E66.01 MORBID OBESITY (MULTI): ICD-10-CM

## 2023-11-07 DIAGNOSIS — F41.9 ANXIETY: ICD-10-CM

## 2023-11-07 DIAGNOSIS — R11.0 NAUSEA: ICD-10-CM

## 2023-11-07 PROCEDURE — 3074F SYST BP LT 130 MM HG: CPT | Performed by: CLINICAL NURSE SPECIALIST

## 2023-11-07 PROCEDURE — 1036F TOBACCO NON-USER: CPT | Performed by: CLINICAL NURSE SPECIALIST

## 2023-11-07 PROCEDURE — 1160F RVW MEDS BY RX/DR IN RCRD: CPT | Performed by: CLINICAL NURSE SPECIALIST

## 2023-11-07 PROCEDURE — 1126F AMNT PAIN NOTED NONE PRSNT: CPT | Performed by: CLINICAL NURSE SPECIALIST

## 2023-11-07 PROCEDURE — G0008 ADMIN INFLUENZA VIRUS VAC: HCPCS | Performed by: CLINICAL NURSE SPECIALIST

## 2023-11-07 PROCEDURE — 3008F BODY MASS INDEX DOCD: CPT | Performed by: CLINICAL NURSE SPECIALIST

## 2023-11-07 PROCEDURE — 99214 OFFICE O/P EST MOD 30 MIN: CPT | Performed by: CLINICAL NURSE SPECIALIST

## 2023-11-07 PROCEDURE — 3048F LDL-C <100 MG/DL: CPT | Performed by: CLINICAL NURSE SPECIALIST

## 2023-11-07 PROCEDURE — 4010F ACE/ARB THERAPY RXD/TAKEN: CPT | Performed by: CLINICAL NURSE SPECIALIST

## 2023-11-07 PROCEDURE — 1159F MED LIST DOCD IN RCRD: CPT | Performed by: CLINICAL NURSE SPECIALIST

## 2023-11-07 PROCEDURE — 3078F DIAST BP <80 MM HG: CPT | Performed by: CLINICAL NURSE SPECIALIST

## 2023-11-07 PROCEDURE — 96372 THER/PROPH/DIAG INJ SC/IM: CPT | Performed by: CLINICAL NURSE SPECIALIST

## 2023-11-07 PROCEDURE — 3044F HG A1C LEVEL LT 7.0%: CPT | Performed by: CLINICAL NURSE SPECIALIST

## 2023-11-07 PROCEDURE — 90662 IIV NO PRSV INCREASED AG IM: CPT | Performed by: CLINICAL NURSE SPECIALIST

## 2023-11-07 PROCEDURE — 3062F POS MACROALBUMINURIA REV: CPT | Performed by: CLINICAL NURSE SPECIALIST

## 2023-11-07 RX ORDER — LEVOTHYROXINE SODIUM 137 UG/1
137 TABLET ORAL DAILY
Qty: 90 TABLET | Refills: 3 | Status: SHIPPED | OUTPATIENT
Start: 2023-11-07 | End: 2024-11-06

## 2023-11-07 RX ORDER — BUPROPION HYDROCHLORIDE 150 MG/1
150 TABLET ORAL DAILY
Qty: 90 TABLET | Refills: 3 | Status: SHIPPED | OUTPATIENT
Start: 2023-11-07 | End: 2024-01-05 | Stop reason: SDUPTHER

## 2023-11-07 RX ORDER — LEVOTHYROXINE SODIUM 137 UG/1
137 TABLET ORAL DAILY
Qty: 90 TABLET | Refills: 3 | Status: SHIPPED | OUTPATIENT
Start: 2023-11-07 | End: 2023-11-07 | Stop reason: SDUPTHER

## 2023-11-07 RX ORDER — CYANOCOBALAMIN 1000 UG/ML
1000 INJECTION, SOLUTION INTRAMUSCULAR; SUBCUTANEOUS ONCE
Status: COMPLETED | OUTPATIENT
Start: 2023-11-07 | End: 2023-11-07

## 2023-11-07 RX ADMIN — CYANOCOBALAMIN 1000 MCG: 1000 INJECTION, SOLUTION INTRAMUSCULAR; SUBCUTANEOUS at 11:36

## 2023-11-07 ASSESSMENT — ENCOUNTER SYMPTOMS
WHEEZING: 0
EYE PAIN: 0
FLANK PAIN: 0
DIZZINESS: 0
SEIZURES: 0
VOMITING: 0
DIARRHEA: 0
SLEEP DISTURBANCE: 0
UNEXPECTED WEIGHT CHANGE: 0
HEADACHES: 0
OCCASIONAL FEELINGS OF UNSTEADINESS: 0
DYSURIA: 0
CONFUSION: 0
MYALGIAS: 0
BACK PAIN: 0
POLYDIPSIA: 0
CONSTIPATION: 0
APPETITE CHANGE: 0
BRUISES/BLEEDS EASILY: 0
HEMATURIA: 0
BLOOD IN STOOL: 0
SHORTNESS OF BREATH: 0
ARTHRALGIAS: 0
COUGH: 0
ACTIVITY CHANGE: 0
CHEST TIGHTNESS: 0
LOSS OF SENSATION IN FEET: 0
SORE THROAT: 0
NAUSEA: 0
DEPRESSION: 0
NECK PAIN: 0
FATIGUE: 0
PALPITATIONS: 0
TROUBLE SWALLOWING: 0
CHILLS: 0
ABDOMINAL PAIN: 0
WOUND: 0
FEVER: 0
JOINT SWELLING: 0
PHOTOPHOBIA: 0

## 2023-11-07 NOTE — PROGRESS NOTES
Subjective   Patient ID: Elizabet Butler is a 68 y.o. female who presents for Follow-up (Follow up).  HPI    Here today as a follow up appointment. Discuss results of blood work.      Thyroid had been uncontrolled. Improved with restarting the oral medication.      The patient states she has been doing well with her Type II Diabetes control since the last visit. Insurance did not cover Mounjaro. Did not tolerate Ozempic. Tolerating Trulicity better.      Comorbid Illnesses: hypertension, hyperlipidemia and obesity.   Disease Course and Complications:. there have been no previous episodes of diabetic ketoacidosis. there have been no previous hospitalizations. She has no known diabetic complications. She has no significant interval events.      Following with [employer]. Following with Cardiology. Recent OV at the beginning of the month.      Increased stressors. Interested in adjusting her medications. Has continued on Amitriptyline and Wellbutrin. Buspar PRN.     Review of Systems   Constitutional:  Negative for activity change, appetite change, chills, fatigue, fever and unexpected weight change.   HENT:  Negative for ear pain, hearing loss, nosebleeds, sore throat, tinnitus and trouble swallowing.    Eyes:  Negative for photophobia, pain and visual disturbance.   Respiratory:  Negative for cough, chest tightness, shortness of breath and wheezing.    Cardiovascular:  Negative for chest pain, palpitations and leg swelling.   Gastrointestinal:  Negative for abdominal pain, blood in stool, constipation, diarrhea, nausea and vomiting.   Endocrine: Negative for cold intolerance, heat intolerance, polydipsia and polyuria.   Genitourinary:  Negative for dysuria, flank pain and hematuria.   Musculoskeletal:  Negative for arthralgias, back pain, joint swelling, myalgias and neck pain.   Skin:  Negative for pallor, rash and wound.   Allergic/Immunologic: Negative for immunocompromised state.   Neurological:  Negative for dizziness,  seizures and headaches.   Hematological:  Does not bruise/bleed easily.   Psychiatric/Behavioral:  Negative for confusion and sleep disturbance.        Objective   Physical Exam  Vitals and nursing note reviewed.   Constitutional:       General: She is not in acute distress.     Appearance: Normal appearance.   HENT:      Head: Normocephalic.      Nose: Nose normal.   Eyes:      Conjunctiva/sclera: Conjunctivae normal.   Neck:      Vascular: No carotid bruit.   Cardiovascular:      Rate and Rhythm: Normal rate and regular rhythm.      Pulses: Normal pulses.      Heart sounds: Normal heart sounds.   Pulmonary:      Effort: Pulmonary effort is normal.      Breath sounds: Normal breath sounds.   Abdominal:      General: Bowel sounds are normal.      Palpations: Abdomen is soft.   Musculoskeletal:         General: Normal range of motion.      Cervical back: Normal range of motion.   Skin:     General: Skin is warm and dry.   Neurological:      Mental Status: She is alert and oriented to person, place, and time. Mental status is at baseline.   Psychiatric:         Mood and Affect: Mood normal.         Behavior: Behavior normal.       Assessment/Plan         Reviewed most recent lab work available.      Hypothyroidism. Continue Levothyroxine dosage. Reevaluate with next lab work.   Abnormal Calcium score. She had normal stress test in the summer of 2017. She has been seen by cardiology and is now on a statin, baby aspirin a beta blocker. She is asymptomatic. Yearly follow-up with cardiology.   PVD: Stable at this time. continue to monitor.   Hypertension. Blood pressures normally well controlled. Will continue current medication. Patient previously discontinued ARB secondary to dizziness.   Insomnia, Anxiety, Depression, History of migraine variant. Doing well continue carvedilol and amitriptyline. Increased Amitriptyline with increased Depression. Continue Wellbutrin, discussed adjusting dosage as she is not sure that  she still needs. Buspirone. Reevaluate at follow up appointment.   Diabetes mellitus type 2: A1C 6.1%. Did not tolerate Ozempic. Initially continue Metformin, monitor blood sugars to discontinue. Continue to monitor hemoglobin A1c. Urine Albumin: October 2023. ARB discontinued by patient as above secondary to dizziness. No diabetic complications. Diabetic eye exam 2022, patient reminded to schedule this years exam. Trulicity.   Mild dyslipidemia. Continue atorvastatin 40 mg daily. Repeat lipid panel yearly is ordered per cardiology.  Obesity: BMI: 42.13. Lifestyle changes as noted above.  Vitamin B12 Deficiency: B12 injections. Reevaluate with next lab work.      No further Pap smears required secondary to age.   DEXA scan May 2019. Would like to hold off on this time.   Normal mammogram January 2023, negative. Ordered for 2024.   Cologuard: September 2023, positive. Planning to follow back with GI to discuss Colonoscopy.    Prevnar 20: August 2023.  Pneumovax: September 2019.   Flu Vaccine: November 2023.   Shingrix: October 2020, March 2021.   Medicare Wellness: January 2023.   Discussed Tdap.

## 2023-11-13 ENCOUNTER — OFFICE VISIT (OUTPATIENT)
Dept: GASTROENTEROLOGY | Facility: CLINIC | Age: 68
End: 2023-11-13
Payer: MEDICARE

## 2023-11-13 VITALS
BODY MASS INDEX: 40.47 KG/M2 | WEIGHT: 267 LBS | HEIGHT: 68 IN | SYSTOLIC BLOOD PRESSURE: 128 MMHG | DIASTOLIC BLOOD PRESSURE: 82 MMHG | OXYGEN SATURATION: 98 %

## 2023-11-13 DIAGNOSIS — R19.5 POSITIVE COLORECTAL CANCER SCREENING USING COLOGUARD TEST: Primary | ICD-10-CM

## 2023-11-13 PROCEDURE — 3044F HG A1C LEVEL LT 7.0%: CPT | Performed by: NURSE PRACTITIONER

## 2023-11-13 PROCEDURE — 3008F BODY MASS INDEX DOCD: CPT | Performed by: NURSE PRACTITIONER

## 2023-11-13 PROCEDURE — 1036F TOBACCO NON-USER: CPT | Performed by: NURSE PRACTITIONER

## 2023-11-13 PROCEDURE — 1160F RVW MEDS BY RX/DR IN RCRD: CPT | Performed by: NURSE PRACTITIONER

## 2023-11-13 PROCEDURE — 3062F POS MACROALBUMINURIA REV: CPT | Performed by: NURSE PRACTITIONER

## 2023-11-13 PROCEDURE — 3079F DIAST BP 80-89 MM HG: CPT | Performed by: NURSE PRACTITIONER

## 2023-11-13 PROCEDURE — 3048F LDL-C <100 MG/DL: CPT | Performed by: NURSE PRACTITIONER

## 2023-11-13 PROCEDURE — 1126F AMNT PAIN NOTED NONE PRSNT: CPT | Performed by: NURSE PRACTITIONER

## 2023-11-13 PROCEDURE — 3074F SYST BP LT 130 MM HG: CPT | Performed by: NURSE PRACTITIONER

## 2023-11-13 PROCEDURE — 4010F ACE/ARB THERAPY RXD/TAKEN: CPT | Performed by: NURSE PRACTITIONER

## 2023-11-13 PROCEDURE — 1159F MED LIST DOCD IN RCRD: CPT | Performed by: NURSE PRACTITIONER

## 2023-11-13 PROCEDURE — 99214 OFFICE O/P EST MOD 30 MIN: CPT | Performed by: NURSE PRACTITIONER

## 2023-11-13 ASSESSMENT — ENCOUNTER SYMPTOMS
SORE THROAT: 0
COUGH: 0
FEVER: 0
BRUISES/BLEEDS EASILY: 0
SHORTNESS OF BREATH: 0
PALPITATIONS: 0
JOINT SWELLING: 0
TROUBLE SWALLOWING: 0
ARTHRALGIAS: 0
CONFUSION: 0
ROS GI COMMENTS: SEE HPI
DIZZINESS: 0
DIFFICULTY URINATING: 0
CHILLS: 0
WEAKNESS: 0
WOUND: 0
ADENOPATHY: 0

## 2023-11-13 NOTE — PROGRESS NOTES
Subjective   Patient ID: Elizabet Butler is a 68 y.o. female with PMH of HTN, HLD, PVD, DM2, hypothyroidism, OA, depression, and GERD who was referred by No ref. provider found for New Patient Visit (OA fail - positive cologuard/Last colonoscopy at least 15 years ago in [place]).     Patient's PCP is PIETRO Carrillo     HPI  OA fail -- BMI 41     Patient had a positive Cologuard 9/13/2023. She last had a colonoscopy in [place] about 15 years ago and reports no polyps at that time. She denies any current GI symptoms. Patient denies any unintended weight loss, nausea, vomiting, dysphagia, reflux, abdominal pain, melena, hematemesis, diarrhea, constipation, or rectal bleeding.        Summary of endoscopies:      Social Hx:  Tobacco: none  Etoh: none  Recreational drug use: none  NSAIDs: none      Family Hx:  No GI malignancy, IBD, or pancreatitis     Review of Systems:  Review of Systems   Constitutional:  Negative for chills and fever.   HENT:  Negative for sore throat and trouble swallowing.    Respiratory:  Negative for cough and shortness of breath.    Cardiovascular:  Negative for chest pain and palpitations.   Gastrointestinal:         SEE HPI   Endocrine: Negative for cold intolerance and heat intolerance.   Genitourinary:  Negative for difficulty urinating.   Musculoskeletal:  Negative for arthralgias and joint swelling.   Skin:  Negative for rash and wound.   Neurological:  Negative for dizziness and weakness.   Hematological:  Negative for adenopathy. Does not bruise/bleed easily.   Psychiatric/Behavioral:  Negative for confusion.         Medications:  Prior to Admission medications    Medication Sig Start Date End Date Taking? Authorizing Provider   amitriptyline (Elavil) 10 mg tablet Take 3 tablets (30 mg) by mouth once daily at bedtime. 10/16/23 4/13/24  MICHAEL Carrillo-CNS   aspirin 81 mg chewable tablet Chew 1 tablet (81 mg) once daily. 10/7/20   Historical Provider, MD   atorvastatin  (Lipitor) 40 mg tablet Take 1 tablet (40 mg) by mouth once daily at bedtime. 8/4/23 1/31/24  PIETRO Carrillo   buPROPion XL (Wellbutrin XL) 150 mg 24 hr tablet Take 1 tablet (150 mg) by mouth once daily. 11/7/23 11/6/24  PIETRO Carrillo   busPIRone (Buspar) 7.5 mg tablet Take 1 tablet (7.5 mg) by mouth if needed. 1/27/23   Historical Provider, MD   carvedilol (Coreg) 12.5 mg tablet Take 1 tablet (12.5 mg) by mouth 2 times a day with meals. 7/27/17   Historical Provider, MD   cholecalciferol (Vitamin D-3) 5,000 Units tablet Take 1 tablet (5,000 Units) by mouth once daily. 8/4/23 1/31/24  PIETRO Carrillo   dulaglutide (Trulicity) 0.75 mg/0.5 mL pen injector Inject 0.75 mg under the skin 1 (one) time per week. 11/1/23   Praveen Marcus MD   hydroCHLOROthiazide (HYDRODiuril) 12.5 mg tablet Take 1 tablet (12.5 mg) by mouth once daily. 8/4/23 1/31/24  PIETRO Carrillo   levothyroxine (Synthroid, Levoxyl) 137 mcg tablet Take 1 tablet (137 mcg) by mouth once daily. 11/7/23 11/6/24  PIETRO Carrillo   losartan (Cozaar) 100 mg tablet Take 1 tablet (100 mg) by mouth once daily. 9/7/23 3/5/24  PIETRO Carrillo   metFORMIN XR (Glucophage-XR) 750 mg 24 hr tablet Take 1 tablet (750 mg) by mouth once daily. as directed 8/4/23 1/31/24  PIETRO Carrillo   omeprazole (PriLOSEC) 40 mg DR capsule Take 1 capsule (40 mg) by mouth once daily. 9/1/16   Historical Provider, MD   OneTouch Ultra Test strip USE 1 STRIP TO CHECK GLUCOSE TWICE DAILY    Historical Provider, MD   buPROPion XL (Wellbutrin XL) 300 mg 24 hr tablet Take 1 tablet (300 mg) by mouth once daily. 8/4/23 11/7/23  PIETRO Carrillo   levothyroxine (Synthroid, Levoxyl) 137 mcg tablet Take 1 tablet (137 mcg) by mouth once daily. 8/4/23 11/7/23  PIETRO Carrillo   levothyroxine (Synthroid, Levoxyl) 137 mcg tablet Take 1 tablet (137 mcg) by mouth once daily. 11/7/23 11/7/23  PIETRO Carrillo        Allergies:  Patient has no known allergies.    Past Medical History:  She has a past medical history of Anxiety disorder, unspecified, Body mass index (BMI) 34.0-34.9, adult (10/16/2019), Body mass index (BMI) 38.0-38.9, adult (2021), Body mass index (BMI) 39.0-39.9, adult (2021), Cellulitis of unspecified part of limb (2021), COVID-19 (2021), Major depressive disorder, single episode, in full remission (CMS/HCC) (2021), Major depressive disorder, single episode, moderate (CMS/HCC) (2022), Personal history of other diseases of the digestive system (2020), Personal history of other diseases of the musculoskeletal system and connective tissue, Personal history of other diseases of the nervous system and sense organs, Personal history of other endocrine, nutritional and metabolic disease, and Personal history of other specified conditions.    Past Surgical History:  She has a past surgical history that includes  section, classic (2017) and Cholecystectomy (2017).    Social History:  She reports that she has never smoked. She has never used smokeless tobacco. She reports that she does not drink alcohol and does not use drugs.    Objective   Physical exam:  Physical Exam  Constitutional:       General: She is not in acute distress.     Appearance: Normal appearance.   HENT:      Mouth/Throat:      Mouth: Mucous membranes are moist.      Comments: pink  Eyes:      Conjunctiva/sclera: Conjunctivae normal.      Pupils: Pupils are equal, round, and reactive to light.   Cardiovascular:      Rate and Rhythm: Normal rate and regular rhythm.      Heart sounds: No murmur heard.  Pulmonary:      Effort: Pulmonary effort is normal.      Breath sounds: Normal breath sounds.   Abdominal:      General: Bowel sounds are normal. There is no distension.      Palpations: Abdomen is soft.      Tenderness: There is no abdominal tenderness. There is no guarding.   Skin:      General: Skin is warm and dry.      Coloration: Skin is not jaundiced.   Neurological:      Mental Status: She is alert and oriented to person, place, and time.   Psychiatric:         Mood and Affect: Mood normal.         Behavior: Behavior normal.          Assessment/Plan       Positive cologuard   Recently had positive Cologuard 9/13/2023. Last colonoscopy was 15 years ago; reports no polyps back then. Will schedule for colonoscopy in [facility].        Ariadna Catherine, APRN-CNP

## 2023-11-13 NOTE — PATIENT INSTRUCTIONS
Thank you for coming to your appointment today   - Do not take the Trulicity the week of your procedure  - You will be scheduled for a colonoscopy in the endoscopy center   - Please follow the bowel prep instructions given to you by the office.   - After your procedure, you can expect to speak to the physician to go over the initial results of the procedure.   - If any polyps are removed during your procedure or if any biopsies are obtained those specimens will go to the pathologists to review under the microscope. Once those results are available they will be sent to the physician electronically to review. These results will also be available to you at that time through the patient portal. These results will be reviewed by the physician and communicated back to you with final recommendations. If you have questions or need additional information I urge you to call the office at [phone], but we do ask for patience as the we are often with patients.   - You were also given information regarding the schedule for your procedure including the time that you need to arrive to the endoscopy unit.  You will also be contacted about 1 week prior to your procedure to confirm the final arrival time.  If you have questions about this or if you need to cancel or change this appointment please call my office at [phone].      Please call [phone] with any questions or concerns

## 2023-11-13 NOTE — H&P (VIEW-ONLY)
Subjective   Patient ID: Elizabet Butler is a 68 y.o. female with PMH of HTN, HLD, PVD, DM2, hypothyroidism, OA, depression, and GERD who was referred by No ref. provider found for New Patient Visit (OA fail - positive cologuard/Last colonoscopy at least 15 years ago in [place]).     Patient's PCP is PIETRO Carrillo     HPI  OA fail -- BMI 41     Patient had a positive Cologuard 9/13/2023. She last had a colonoscopy in [place] about 15 years ago and reports no polyps at that time. She denies any current GI symptoms. Patient denies any unintended weight loss, nausea, vomiting, dysphagia, reflux, abdominal pain, melena, hematemesis, diarrhea, constipation, or rectal bleeding.        Summary of endoscopies:      Social Hx:  Tobacco: none  Etoh: none  Recreational drug use: none  NSAIDs: none      Family Hx:  No GI malignancy, IBD, or pancreatitis     Review of Systems:  Review of Systems   Constitutional:  Negative for chills and fever.   HENT:  Negative for sore throat and trouble swallowing.    Respiratory:  Negative for cough and shortness of breath.    Cardiovascular:  Negative for chest pain and palpitations.   Gastrointestinal:         SEE HPI   Endocrine: Negative for cold intolerance and heat intolerance.   Genitourinary:  Negative for difficulty urinating.   Musculoskeletal:  Negative for arthralgias and joint swelling.   Skin:  Negative for rash and wound.   Neurological:  Negative for dizziness and weakness.   Hematological:  Negative for adenopathy. Does not bruise/bleed easily.   Psychiatric/Behavioral:  Negative for confusion.         Medications:  Prior to Admission medications    Medication Sig Start Date End Date Taking? Authorizing Provider   amitriptyline (Elavil) 10 mg tablet Take 3 tablets (30 mg) by mouth once daily at bedtime. 10/16/23 4/13/24  MICHAEL Carrillo-CNS   aspirin 81 mg chewable tablet Chew 1 tablet (81 mg) once daily. 10/7/20   Historical Provider, MD   atorvastatin  (Lipitor) 40 mg tablet Take 1 tablet (40 mg) by mouth once daily at bedtime. 8/4/23 1/31/24  PIETRO Carrillo   buPROPion XL (Wellbutrin XL) 150 mg 24 hr tablet Take 1 tablet (150 mg) by mouth once daily. 11/7/23 11/6/24  PIETRO Carrillo   busPIRone (Buspar) 7.5 mg tablet Take 1 tablet (7.5 mg) by mouth if needed. 1/27/23   Historical Provider, MD   carvedilol (Coreg) 12.5 mg tablet Take 1 tablet (12.5 mg) by mouth 2 times a day with meals. 7/27/17   Historical Provider, MD   cholecalciferol (Vitamin D-3) 5,000 Units tablet Take 1 tablet (5,000 Units) by mouth once daily. 8/4/23 1/31/24  PIETRO Carrillo   dulaglutide (Trulicity) 0.75 mg/0.5 mL pen injector Inject 0.75 mg under the skin 1 (one) time per week. 11/1/23   Praveen Marcus MD   hydroCHLOROthiazide (HYDRODiuril) 12.5 mg tablet Take 1 tablet (12.5 mg) by mouth once daily. 8/4/23 1/31/24  PIETRO Carrillo   levothyroxine (Synthroid, Levoxyl) 137 mcg tablet Take 1 tablet (137 mcg) by mouth once daily. 11/7/23 11/6/24  PIETRO Carrillo   losartan (Cozaar) 100 mg tablet Take 1 tablet (100 mg) by mouth once daily. 9/7/23 3/5/24  PIETRO Carrillo   metFORMIN XR (Glucophage-XR) 750 mg 24 hr tablet Take 1 tablet (750 mg) by mouth once daily. as directed 8/4/23 1/31/24  PIETRO Carrillo   omeprazole (PriLOSEC) 40 mg DR capsule Take 1 capsule (40 mg) by mouth once daily. 9/1/16   Historical Provider, MD   OneTouch Ultra Test strip USE 1 STRIP TO CHECK GLUCOSE TWICE DAILY    Historical Provider, MD   buPROPion XL (Wellbutrin XL) 300 mg 24 hr tablet Take 1 tablet (300 mg) by mouth once daily. 8/4/23 11/7/23  PIETRO Carrillo   levothyroxine (Synthroid, Levoxyl) 137 mcg tablet Take 1 tablet (137 mcg) by mouth once daily. 8/4/23 11/7/23  PIETRO Carrillo   levothyroxine (Synthroid, Levoxyl) 137 mcg tablet Take 1 tablet (137 mcg) by mouth once daily. 11/7/23 11/7/23  PIETRO Carrillo        Allergies:  Patient has no known allergies.    Past Medical History:  She has a past medical history of Anxiety disorder, unspecified, Body mass index (BMI) 34.0-34.9, adult (10/16/2019), Body mass index (BMI) 38.0-38.9, adult (2021), Body mass index (BMI) 39.0-39.9, adult (2021), Cellulitis of unspecified part of limb (2021), COVID-19 (2021), Major depressive disorder, single episode, in full remission (CMS/HCC) (2021), Major depressive disorder, single episode, moderate (CMS/HCC) (2022), Personal history of other diseases of the digestive system (2020), Personal history of other diseases of the musculoskeletal system and connective tissue, Personal history of other diseases of the nervous system and sense organs, Personal history of other endocrine, nutritional and metabolic disease, and Personal history of other specified conditions.    Past Surgical History:  She has a past surgical history that includes  section, classic (2017) and Cholecystectomy (2017).    Social History:  She reports that she has never smoked. She has never used smokeless tobacco. She reports that she does not drink alcohol and does not use drugs.    Objective   Physical exam:  Physical Exam  Constitutional:       General: She is not in acute distress.     Appearance: Normal appearance.   HENT:      Mouth/Throat:      Mouth: Mucous membranes are moist.      Comments: pink  Eyes:      Conjunctiva/sclera: Conjunctivae normal.      Pupils: Pupils are equal, round, and reactive to light.   Cardiovascular:      Rate and Rhythm: Normal rate and regular rhythm.      Heart sounds: No murmur heard.  Pulmonary:      Effort: Pulmonary effort is normal.      Breath sounds: Normal breath sounds.   Abdominal:      General: Bowel sounds are normal. There is no distension.      Palpations: Abdomen is soft.      Tenderness: There is no abdominal tenderness. There is no guarding.   Skin:      General: Skin is warm and dry.      Coloration: Skin is not jaundiced.   Neurological:      Mental Status: She is alert and oriented to person, place, and time.   Psychiatric:         Mood and Affect: Mood normal.         Behavior: Behavior normal.          Assessment/Plan       Positive cologuard   Recently had positive Cologuard 9/13/2023. Last colonoscopy was 15 years ago; reports no polyps back then. Will schedule for colonoscopy in [facility].        Ariadna Catherine, APRN-CNP

## 2023-12-07 ENCOUNTER — CLINICAL SUPPORT (OUTPATIENT)
Dept: PRIMARY CARE | Facility: CLINIC | Age: 68
End: 2023-12-07
Payer: MEDICARE

## 2023-12-07 DIAGNOSIS — E53.8 VITAMIN B 12 DEFICIENCY: ICD-10-CM

## 2023-12-07 RX ORDER — CYANOCOBALAMIN 1000 UG/ML
1000 INJECTION, SOLUTION INTRAMUSCULAR; SUBCUTANEOUS ONCE
Status: SHIPPED | OUTPATIENT
Start: 2023-12-07

## 2023-12-11 ENCOUNTER — ANESTHESIA EVENT (OUTPATIENT)
Dept: GASTROENTEROLOGY | Facility: HOSPITAL | Age: 68
End: 2023-12-11
Payer: MEDICARE

## 2023-12-12 ENCOUNTER — ANESTHESIA (OUTPATIENT)
Dept: GASTROENTEROLOGY | Facility: HOSPITAL | Age: 68
End: 2023-12-12
Payer: MEDICARE

## 2023-12-12 ENCOUNTER — HOSPITAL ENCOUNTER (OUTPATIENT)
Dept: GASTROENTEROLOGY | Facility: HOSPITAL | Age: 68
Setting detail: OUTPATIENT SURGERY
Discharge: HOME | End: 2023-12-12
Payer: MEDICARE

## 2023-12-12 VITALS
TEMPERATURE: 97.5 F | SYSTOLIC BLOOD PRESSURE: 111 MMHG | WEIGHT: 263 LBS | DIASTOLIC BLOOD PRESSURE: 69 MMHG | RESPIRATION RATE: 20 BRPM | HEIGHT: 67 IN | HEART RATE: 80 BPM | OXYGEN SATURATION: 95 % | BODY MASS INDEX: 41.28 KG/M2

## 2023-12-12 DIAGNOSIS — R19.5 POSITIVE COLORECTAL CANCER SCREENING USING COLOGUARD TEST: ICD-10-CM

## 2023-12-12 PROCEDURE — 3700000002 HC GENERAL ANESTHESIA TIME - EACH INCREMENTAL 1 MINUTE: Performed by: INTERNAL MEDICINE

## 2023-12-12 PROCEDURE — 3700000001 HC GENERAL ANESTHESIA TIME - INITIAL BASE CHARGE: Performed by: INTERNAL MEDICINE

## 2023-12-12 PROCEDURE — 88305 TISSUE EXAM BY PATHOLOGIST: CPT | Mod: TC,SUR,PORLAB | Performed by: INTERNAL MEDICINE

## 2023-12-12 PROCEDURE — 45385 COLONOSCOPY W/LESION REMOVAL: CPT | Performed by: INTERNAL MEDICINE

## 2023-12-12 PROCEDURE — 88305 TISSUE EXAM BY PATHOLOGIST: CPT | Performed by: PATHOLOGY

## 2023-12-12 PROCEDURE — 45378 DIAGNOSTIC COLONOSCOPY: CPT | Performed by: INTERNAL MEDICINE

## 2023-12-12 PROCEDURE — 2500000005 HC RX 250 GENERAL PHARMACY W/O HCPCS: Performed by: NURSE ANESTHETIST, CERTIFIED REGISTERED

## 2023-12-12 PROCEDURE — 2500000004 HC RX 250 GENERAL PHARMACY W/ HCPCS (ALT 636 FOR OP/ED): Performed by: INTERNAL MEDICINE

## 2023-12-12 PROCEDURE — 7100000010 HC PHASE TWO TIME - EACH INCREMENTAL 1 MINUTE: Performed by: INTERNAL MEDICINE

## 2023-12-12 PROCEDURE — 7100000009 HC PHASE TWO TIME - INITIAL BASE CHARGE: Performed by: INTERNAL MEDICINE

## 2023-12-12 PROCEDURE — 2500000004 HC RX 250 GENERAL PHARMACY W/ HCPCS (ALT 636 FOR OP/ED): Performed by: NURSE ANESTHETIST, CERTIFIED REGISTERED

## 2023-12-12 RX ORDER — SODIUM CHLORIDE 9 MG/ML
20 INJECTION, SOLUTION INTRAVENOUS CONTINUOUS
Status: DISCONTINUED | OUTPATIENT
Start: 2023-12-12 | End: 2023-12-13 | Stop reason: HOSPADM

## 2023-12-12 RX ORDER — PROPOFOL 10 MG/ML
INJECTION, EMULSION INTRAVENOUS AS NEEDED
Status: DISCONTINUED | OUTPATIENT
Start: 2023-12-12 | End: 2023-12-12

## 2023-12-12 RX ORDER — LIDOCAINE HYDROCHLORIDE 20 MG/ML
INJECTION, SOLUTION EPIDURAL; INFILTRATION; INTRACAUDAL; PERINEURAL AS NEEDED
Status: DISCONTINUED | OUTPATIENT
Start: 2023-12-12 | End: 2023-12-12

## 2023-12-12 RX ADMIN — PROPOFOL 20 MG: 10 INJECTION, EMULSION INTRAVENOUS at 13:24

## 2023-12-12 RX ADMIN — LIDOCAINE HYDROCHLORIDE 50 MG: 20 INJECTION, SOLUTION EPIDURAL; INFILTRATION; INTRACAUDAL; PERINEURAL at 13:12

## 2023-12-12 RX ADMIN — PROPOFOL 20 MG: 10 INJECTION, EMULSION INTRAVENOUS at 13:14

## 2023-12-12 RX ADMIN — PROPOFOL 20 MG: 10 INJECTION, EMULSION INTRAVENOUS at 13:21

## 2023-12-12 RX ADMIN — PROPOFOL 40 MG: 10 INJECTION, EMULSION INTRAVENOUS at 13:20

## 2023-12-12 RX ADMIN — PROPOFOL 100 MG: 10 INJECTION, EMULSION INTRAVENOUS at 13:12

## 2023-12-12 RX ADMIN — PROPOFOL 20 MG: 10 INJECTION, EMULSION INTRAVENOUS at 13:17

## 2023-12-12 RX ADMIN — PROPOFOL 20 MG: 10 INJECTION, EMULSION INTRAVENOUS at 13:30

## 2023-12-12 RX ADMIN — PROPOFOL 20 MG: 10 INJECTION, EMULSION INTRAVENOUS at 13:26

## 2023-12-12 RX ADMIN — SODIUM CHLORIDE 20 ML/HR: 9 INJECTION, SOLUTION INTRAVENOUS at 12:30

## 2023-12-12 SDOH — HEALTH STABILITY: MENTAL HEALTH: CURRENT SMOKER: 0

## 2023-12-12 ASSESSMENT — PAIN SCALES - GENERAL
PAINLEVEL_OUTOF10: 0 - NO PAIN
PAIN_LEVEL: 0
PAINLEVEL_OUTOF10: 0 - NO PAIN

## 2023-12-12 ASSESSMENT — PAIN - FUNCTIONAL ASSESSMENT
PAIN_FUNCTIONAL_ASSESSMENT: 0-10

## 2023-12-12 NOTE — ANESTHESIA POSTPROCEDURE EVALUATION
Patient: Elizabet Butler    Procedure Summary       Date: 12/12/23 Room / Location: [facility]    Anesthesia Start: 1308 Anesthesia Stop: 1340    Procedure: COLONOSCOPY Diagnosis: Positive colorectal cancer screening using Cologuard test    Scheduled Providers: Raphael Franklin DO Responsible Provider: DAYA Escobedo    Anesthesia Type: MAC ASA Status: 2            Anesthesia Type: MAC    Vitals Value Taken Time   /68 12/12/23 1336   Temp 36.4 °C (97.6 °F) 12/12/23 1336   Pulse 80 12/12/23 1336   Resp 14 12/12/23 1336   SpO2 93 % 12/12/23 1336       Anesthesia Post Evaluation    Patient location during evaluation: PACU  Patient participation: complete - patient participated  Level of consciousness: awake and alert  Pain score: 0  Pain management: adequate  Airway patency: patent  Cardiovascular status: acceptable and hemodynamically stable  Respiratory status: acceptable, spontaneous ventilation and room air  Hydration status: acceptable  Postoperative Nausea and Vomiting: none        There were no known notable events for this encounter.

## 2023-12-13 NOTE — ADDENDUM NOTE
Encounter addended by: Petr Moseley RN on: 12/13/2023 1:11 PM   Actions taken: Contacts section saved, Flowsheet accepted

## 2023-12-15 ENCOUNTER — TELEPHONE (OUTPATIENT)
Dept: CARDIOLOGY | Facility: CLINIC | Age: 68
End: 2023-12-15
Payer: MEDICARE

## 2023-12-15 NOTE — TELEPHONE ENCOUNTER
Called patient had to leave a message that when she was seen in the office on 9/29/23 her carvedilol was sent to the pharmacy for 90 days with two refills.  Any further questions call our office.  ----- Message from Tracie Zhang sent at 12/15/2023 12:05 PM EST -----  Regarding: Med Refill  Patient called for refill of Carvedilol/Coreg 12.5 mg Twice daily.  Please send to [facility] [address]

## 2023-12-18 LAB
LABORATORY COMMENT REPORT: NORMAL
PATH REPORT.FINAL DX SPEC: NORMAL
PATH REPORT.GROSS SPEC: NORMAL
PATH REPORT.TOTAL CANCER: NORMAL

## 2023-12-19 NOTE — ADDENDUM NOTE
Encounter addended by: Raphael Franklin DO on: 12/19/2023 9:13 AM   Actions taken: SmartForm saved, Image edited, Order Reconciliation Section accessed

## 2024-01-04 ENCOUNTER — PATIENT MESSAGE (OUTPATIENT)
Dept: PRIMARY CARE | Facility: CLINIC | Age: 69
End: 2024-01-04
Payer: MEDICARE

## 2024-01-04 DIAGNOSIS — F41.9 ANXIETY: ICD-10-CM

## 2024-01-05 RX ORDER — BUPROPION HYDROCHLORIDE 300 MG/1
300 TABLET ORAL DAILY
Qty: 90 TABLET | Refills: 3 | Status: SHIPPED | OUTPATIENT
Start: 2024-01-05 | End: 2025-01-04

## 2024-01-08 ENCOUNTER — CLINICAL SUPPORT (OUTPATIENT)
Dept: PRIMARY CARE | Facility: CLINIC | Age: 69
End: 2024-01-08
Payer: MEDICARE

## 2024-01-08 DIAGNOSIS — E53.8 VITAMIN B 12 DEFICIENCY: ICD-10-CM

## 2024-01-08 PROCEDURE — 96372 THER/PROPH/DIAG INJ SC/IM: CPT | Performed by: CLINICAL NURSE SPECIALIST

## 2024-01-08 RX ORDER — CYANOCOBALAMIN 1000 UG/ML
1000 INJECTION, SOLUTION INTRAMUSCULAR; SUBCUTANEOUS ONCE
Status: COMPLETED | OUTPATIENT
Start: 2024-01-08 | End: 2024-01-08

## 2024-01-08 RX ADMIN — CYANOCOBALAMIN 1000 MCG: 1000 INJECTION, SOLUTION INTRAMUSCULAR; SUBCUTANEOUS at 12:03

## 2024-01-25 ENCOUNTER — PATIENT MESSAGE (OUTPATIENT)
Dept: PRIMARY CARE | Facility: CLINIC | Age: 69
End: 2024-01-25
Payer: MEDICARE

## 2024-01-25 DIAGNOSIS — E11.9 TYPE 2 DIABETES MELLITUS WITHOUT COMPLICATION, WITHOUT LONG-TERM CURRENT USE OF INSULIN (MULTI): ICD-10-CM

## 2024-01-25 DIAGNOSIS — E78.5 HYPERLIPIDEMIA ASSOCIATED WITH TYPE 2 DIABETES MELLITUS (MULTI): ICD-10-CM

## 2024-01-25 DIAGNOSIS — K21.9 GASTROESOPHAGEAL REFLUX DISEASE, UNSPECIFIED WHETHER ESOPHAGITIS PRESENT: ICD-10-CM

## 2024-01-25 DIAGNOSIS — E11.69 HYPERLIPIDEMIA ASSOCIATED WITH TYPE 2 DIABETES MELLITUS (MULTI): ICD-10-CM

## 2024-01-26 RX ORDER — DEXTROSE 4 G
TABLET,CHEWABLE ORAL
Qty: 1 EACH | Refills: 0 | Status: SHIPPED | OUTPATIENT
Start: 2024-01-26

## 2024-01-26 RX ORDER — DULAGLUTIDE 1.5 MG/.5ML
1.5 INJECTION, SOLUTION SUBCUTANEOUS
Qty: 2 ML | Refills: 11 | Status: SHIPPED | OUTPATIENT
Start: 2024-01-26 | End: 2024-03-05 | Stop reason: ALTCHOICE

## 2024-01-26 RX ORDER — LANCETS
EACH MISCELLANEOUS
Qty: 100 EACH | Refills: 11 | Status: SHIPPED | OUTPATIENT
Start: 2024-01-26

## 2024-01-26 RX ORDER — OMEPRAZOLE 40 MG/1
40 CAPSULE, DELAYED RELEASE ORAL DAILY
Qty: 90 CAPSULE | Refills: 1 | Status: SHIPPED | OUTPATIENT
Start: 2024-01-26 | End: 2024-07-24

## 2024-01-26 RX ORDER — ATORVASTATIN CALCIUM 40 MG/1
40 TABLET, FILM COATED ORAL NIGHTLY
Qty: 90 TABLET | Refills: 1 | Status: SHIPPED | OUTPATIENT
Start: 2024-01-26 | End: 2024-04-02 | Stop reason: SDUPTHER

## 2024-02-05 ENCOUNTER — TELEPHONE (OUTPATIENT)
Dept: PRIMARY CARE | Facility: CLINIC | Age: 69
End: 2024-02-05
Payer: MEDICARE

## 2024-02-05 DIAGNOSIS — I10 BENIGN ESSENTIAL HYPERTENSION: ICD-10-CM

## 2024-02-05 RX ORDER — HYDROCHLOROTHIAZIDE 12.5 MG/1
12.5 TABLET ORAL DAILY
Qty: 90 TABLET | Refills: 1 | Status: SHIPPED | OUTPATIENT
Start: 2024-02-05 | End: 2024-04-02 | Stop reason: SDUPTHER

## 2024-02-05 NOTE — TELEPHONE ENCOUNTER
Needs a refill on her Hydrochlorothiazide 12.5 mg takes it 1 time a day please send to [facility] in [place]

## 2024-02-07 ENCOUNTER — CLINICAL SUPPORT (OUTPATIENT)
Dept: PRIMARY CARE | Facility: CLINIC | Age: 69
End: 2024-02-07
Payer: MEDICARE

## 2024-02-07 DIAGNOSIS — E53.8 VITAMIN B 12 DEFICIENCY: ICD-10-CM

## 2024-02-07 PROCEDURE — 96372 THER/PROPH/DIAG INJ SC/IM: CPT | Performed by: CLINICAL NURSE SPECIALIST

## 2024-02-07 RX ORDER — CYANOCOBALAMIN 1000 UG/ML
1000 INJECTION, SOLUTION INTRAMUSCULAR; SUBCUTANEOUS ONCE
Status: COMPLETED | OUTPATIENT
Start: 2024-02-07 | End: 2024-02-07

## 2024-02-07 RX ADMIN — CYANOCOBALAMIN 1000 MCG: 1000 INJECTION, SOLUTION INTRAMUSCULAR; SUBCUTANEOUS at 11:41

## 2024-02-28 DIAGNOSIS — E11.9 TYPE 2 DIABETES MELLITUS WITHOUT COMPLICATION, WITHOUT LONG-TERM CURRENT USE OF INSULIN (MULTI): ICD-10-CM

## 2024-02-28 DIAGNOSIS — I10 BENIGN ESSENTIAL HYPERTENSION: ICD-10-CM

## 2024-02-28 RX ORDER — METFORMIN HYDROCHLORIDE 750 MG/1
750 TABLET, EXTENDED RELEASE ORAL DAILY
Qty: 90 TABLET | Refills: 1 | Status: SHIPPED | OUTPATIENT
Start: 2024-02-28 | End: 2024-08-26

## 2024-02-28 RX ORDER — LOSARTAN POTASSIUM 100 MG/1
100 TABLET ORAL DAILY
Qty: 90 TABLET | Refills: 1 | Status: SHIPPED | OUTPATIENT
Start: 2024-02-28 | End: 2024-04-02 | Stop reason: SDUPTHER

## 2024-03-05 ENCOUNTER — TELEPHONE (OUTPATIENT)
Dept: PRIMARY CARE | Facility: CLINIC | Age: 69
End: 2024-03-05
Payer: MEDICARE

## 2024-03-05 ENCOUNTER — PATIENT MESSAGE (OUTPATIENT)
Dept: PRIMARY CARE | Facility: CLINIC | Age: 69
End: 2024-03-05
Payer: MEDICARE

## 2024-03-05 DIAGNOSIS — E11.9 TYPE 2 DIABETES MELLITUS WITHOUT COMPLICATION, WITHOUT LONG-TERM CURRENT USE OF INSULIN (MULTI): Primary | ICD-10-CM

## 2024-03-05 DIAGNOSIS — E11.9 TYPE 2 DIABETES MELLITUS WITHOUT COMPLICATION, WITHOUT LONG-TERM CURRENT USE OF INSULIN (MULTI): ICD-10-CM

## 2024-03-05 RX ORDER — TIRZEPATIDE 2.5 MG/.5ML
2.5 INJECTION, SOLUTION SUBCUTANEOUS
Qty: 2 ML | Refills: 2 | Status: SHIPPED | OUTPATIENT
Start: 2024-03-05 | End: 2024-05-07 | Stop reason: SDUPTHER

## 2024-03-05 RX ORDER — TIRZEPATIDE 2.5 MG/.5ML
2.5 INJECTION, SOLUTION SUBCUTANEOUS
Qty: 3 ML | Refills: 2 | Status: SHIPPED | OUTPATIENT
Start: 2024-03-05 | End: 2024-03-05 | Stop reason: SDUPTHER

## 2024-03-07 ENCOUNTER — CLINICAL SUPPORT (OUTPATIENT)
Dept: PRIMARY CARE | Facility: CLINIC | Age: 69
End: 2024-03-07
Payer: MEDICARE

## 2024-03-07 DIAGNOSIS — E53.8 VITAMIN B 12 DEFICIENCY: ICD-10-CM

## 2024-03-07 PROCEDURE — 96372 THER/PROPH/DIAG INJ SC/IM: CPT | Performed by: CLINICAL NURSE SPECIALIST

## 2024-03-07 RX ORDER — CYANOCOBALAMIN 1000 UG/ML
1000 INJECTION, SOLUTION INTRAMUSCULAR; SUBCUTANEOUS ONCE
Status: COMPLETED | OUTPATIENT
Start: 2024-03-07 | End: 2024-03-07

## 2024-03-07 RX ADMIN — CYANOCOBALAMIN 1000 MCG: 1000 INJECTION, SOLUTION INTRAMUSCULAR; SUBCUTANEOUS at 11:02

## 2024-03-29 RX ORDER — ACETAMINOPHEN 500 MG
5000 TABLET ORAL DAILY
COMMUNITY

## 2024-04-01 DIAGNOSIS — F41.9 ANXIETY: Primary | ICD-10-CM

## 2024-04-01 RX ORDER — BUSPIRONE HYDROCHLORIDE 7.5 MG/1
7.5 TABLET ORAL AS NEEDED
Qty: 30 TABLET | Refills: 2 | Status: SHIPPED | OUTPATIENT
Start: 2024-04-01 | End: 2024-04-02 | Stop reason: WASHOUT

## 2024-04-01 NOTE — TELEPHONE ENCOUNTER
Buspirone 7.5 mg Take 1 tablet (7.5 mg) by mouth if needed.     [facility] [place]    LOV: 11/7/23  NOV: 5/7/24  
pended  
Patient

## 2024-04-02 ENCOUNTER — OFFICE VISIT (OUTPATIENT)
Dept: CARDIOLOGY | Facility: CLINIC | Age: 69
End: 2024-04-02
Payer: MEDICARE

## 2024-04-02 VITALS
SYSTOLIC BLOOD PRESSURE: 142 MMHG | WEIGHT: 268 LBS | DIASTOLIC BLOOD PRESSURE: 78 MMHG | HEART RATE: 80 BPM | BODY MASS INDEX: 42.06 KG/M2 | HEIGHT: 67 IN

## 2024-04-02 DIAGNOSIS — I25.10 ATHEROSCLEROSIS OF NATIVE CORONARY ARTERY OF NATIVE HEART WITHOUT ANGINA PECTORIS: ICD-10-CM

## 2024-04-02 DIAGNOSIS — I10 BENIGN ESSENTIAL HYPERTENSION: ICD-10-CM

## 2024-04-02 DIAGNOSIS — E11.69 HYPERLIPIDEMIA ASSOCIATED WITH TYPE 2 DIABETES MELLITUS (MULTI): ICD-10-CM

## 2024-04-02 DIAGNOSIS — R00.2 INTERMITTENT PALPITATIONS: ICD-10-CM

## 2024-04-02 DIAGNOSIS — E78.5 HYPERLIPIDEMIA ASSOCIATED WITH TYPE 2 DIABETES MELLITUS (MULTI): ICD-10-CM

## 2024-04-02 DIAGNOSIS — R93.1 ABNORMAL SCREENING CARDIAC CT: Primary | ICD-10-CM

## 2024-04-02 PROCEDURE — 93000 ELECTROCARDIOGRAM COMPLETE: CPT | Performed by: INTERNAL MEDICINE

## 2024-04-02 PROCEDURE — 3077F SYST BP >= 140 MM HG: CPT | Performed by: INTERNAL MEDICINE

## 2024-04-02 PROCEDURE — 1159F MED LIST DOCD IN RCRD: CPT | Performed by: INTERNAL MEDICINE

## 2024-04-02 PROCEDURE — 4010F ACE/ARB THERAPY RXD/TAKEN: CPT | Performed by: INTERNAL MEDICINE

## 2024-04-02 PROCEDURE — 1036F TOBACCO NON-USER: CPT | Performed by: INTERNAL MEDICINE

## 2024-04-02 PROCEDURE — 3078F DIAST BP <80 MM HG: CPT | Performed by: INTERNAL MEDICINE

## 2024-04-02 PROCEDURE — 99214 OFFICE O/P EST MOD 30 MIN: CPT | Performed by: INTERNAL MEDICINE

## 2024-04-02 PROCEDURE — 1157F ADVNC CARE PLAN IN RCRD: CPT | Performed by: INTERNAL MEDICINE

## 2024-04-02 PROCEDURE — 3008F BODY MASS INDEX DOCD: CPT | Performed by: INTERNAL MEDICINE

## 2024-04-02 RX ORDER — CARVEDILOL 12.5 MG/1
12.5 TABLET ORAL
Qty: 180 TABLET | Refills: 3 | Status: SHIPPED | OUTPATIENT
Start: 2024-04-02 | End: 2025-04-02

## 2024-04-02 RX ORDER — LOSARTAN POTASSIUM 100 MG/1
100 TABLET ORAL DAILY
Qty: 90 TABLET | Refills: 3 | Status: SHIPPED | OUTPATIENT
Start: 2024-04-02 | End: 2025-04-02

## 2024-04-02 RX ORDER — ATORVASTATIN CALCIUM 40 MG/1
60 TABLET, FILM COATED ORAL NIGHTLY
Qty: 135 TABLET | Refills: 3 | Status: SHIPPED | OUTPATIENT
Start: 2024-04-02 | End: 2025-04-02

## 2024-04-02 RX ORDER — ATORVASTATIN CALCIUM 40 MG/1
40 TABLET, FILM COATED ORAL NIGHTLY
Qty: 90 TABLET | Refills: 3 | Status: SHIPPED | OUTPATIENT
Start: 2024-04-02 | End: 2024-04-02 | Stop reason: SDUPTHER

## 2024-04-02 RX ORDER — HYDROCHLOROTHIAZIDE 12.5 MG/1
12.5 TABLET ORAL DAILY
Qty: 90 TABLET | Refills: 3 | Status: SHIPPED | OUTPATIENT
Start: 2024-04-02 | End: 2025-04-02

## 2024-04-02 NOTE — PROGRESS NOTES
Chief Complaint:   No chief complaint on file.     History Of Present Illness:    Elizabet Butler is a 68 y.o. female presenting for 6 month follow up.   H/o atherosclerotic coronary disease (CAC score 429 in 6/2017), and the presence of hyperlipidemia, hypertension, and diabetes. She is a former patient of Dr. Ivan Barker.     Telling me she has been checking her BP at home, running 118-130's/60-80's.  She denies chest pain/pressure, SOB, MULLER. She denies significant LE edema, but does report varicose veins.  She does walk her dog and runs the vacuum without any exertional sx.   Compliant with outpatient cardiac medications as prescribed.   Otherwise has been doing well.  Every once in a while she has an occasional flutter in her throat - rare, lasts a few seconds and resolves.  Also reporting occ palpitations in her chest, like heart pounding, lasts up to 5 minutes - can have these episodes maybe twice a month.  She does not smoke.    ROS:  The remainder of the review of systems was obtained, as was negative as pertains to the chief complaint.    CV testing reviewed :     CT CAC score 3/2023  LM: 0. Previously 0.1   LAD: 848.0. Previously 414.9   LCx: 32.2. Previously 0.4   RCA: 256.2. Previously 13.6    Total: 1136.4. Previously 429.     Lipid labs 10/2023  chol 167,  HDL 67.4  LDL 82, trig 90  CMP  K 4.3  BUN 14  crea 1.16    Stress test  4/2023   Summary:  1. Indeterminate graded exercise EKG Stress Test.  2. Indeterminate due to baseline ST segment abnormality.  3. No clinical or echocardiographic evidence for ischemia at maximal infusion.  4. No ECG changes from baseline.  5. The adequate level of stress was achieved.    Last Recorded Vitals:  There were no vitals filed for this visit.    Past Medical History:  She has a past medical history of Anxiety disorder, unspecified, Body mass index (BMI) 34.0-34.9, adult (10/16/2019), Body mass index (BMI) 38.0-38.9, adult (09/21/2021), Body mass index (BMI) 39.0-39.9,  adult (2021), Cellulitis of unspecified part of limb (2021), COVID-19 (2021), DM2 (diabetes mellitus, type 2) (CMS/[facility]), Hyperlipidemia, Hypertension, Major depressive disorder, single episode, in full remission (CMS/[facility]) (2021), Major depressive disorder, single episode, moderate (CMS/[facility]) (2022), Personal history of other diseases of the digestive system (2020), Personal history of other diseases of the musculoskeletal system and connective tissue, Personal history of other diseases of the nervous system and sense organs, Personal history of other endocrine, nutritional and metabolic disease, and Personal history of other specified conditions.    Past Surgical History:  She has a past surgical history that includes  section, classic (2017) and Cholecystectomy (2017).      Social History:  She reports that she has never smoked. She has never used smokeless tobacco. She reports that she does not drink alcohol and does not use drugs.    Family History:  Family History   Problem Relation Name Age of Onset    Cancer Father      Hypertension Father      Heart disease Father          Allergies:  Patient has no known allergies.    Outpatient Medications:  Current Outpatient Medications   Medication Instructions    amitriptyline (ELAVIL) 30 mg, oral, Nightly    aspirin 81 mg chewable tablet 1 tablet, oral, Daily    atorvastatin (LIPITOR) 40 mg, oral, Nightly    blood sugar diagnostic strip 1 strip, miscellaneous, Daily    blood-glucose meter misc Check blood sugars once daily    buPROPion XL (WELLBUTRIN XL) 300 mg, oral, Daily    busPIRone (BUSPAR) 7.5 mg, oral, As needed    carvedilol (Coreg) 12.5 mg tablet 1 tablet, oral, 2 times daily with meals    cholecalciferol (VITAMIN D3) 5,000 Units, oral, Daily    hydroCHLOROthiazide (MICROZIDE) 12.5 mg, oral, Daily    lancets misc Check blood sugars daily    levothyroxine (SYNTHROID, LEVOXYL) 137 mcg, oral, Daily     losartan (COZAAR) 100 mg, oral, Daily    metFORMIN XR (GLUCOPHAGE-XR) 750 mg, oral, Daily, as directed    Mounjaro 2.5 mg, subcutaneous, Weekly    omeprazole (PRILOSEC) 40 mg, oral, Daily    OneTouch Ultra Test strip USE 1 STRIP TO CHECK GLUCOSE TWICE DAILY       Physical Exam:  Physical Exam  HENT:      Head: Normocephalic.      Nose: Nose normal.      Mouth/Throat:      Mouth: Mucous membranes are moist.   Cardiovascular:      Rate and Rhythm: Normal rate and regular rhythm.      Heart sounds: S1 normal and S2 normal.      Comments: No carotid bruits  Pulmonary:      Effort: Pulmonary effort is normal.      Breath sounds: Normal breath sounds.   Abdominal:      Palpations: Abdomen is soft.   Musculoskeletal:         General: Normal range of motion.      Cervical back: Normal range of motion.   Skin:     General: Skin is warm and dry.   Neurological:      General: No focal deficit present.      Mental Status: She is alert.   Psychiatric:         Mood and Affect: Mood normal.            Last Labs:  CBC -  Lab Results   Component Value Date    WBC 6.1 08/01/2023    HGB 11.7 (L) 08/01/2023    HCT 36.3 08/01/2023    MCV 89 08/01/2023     08/01/2023       CMP -  Lab Results   Component Value Date    CALCIUM 9.5 10/30/2023    PROT 7.0 10/30/2023    ALBUMIN 4.2 10/30/2023    AST 18 10/30/2023    ALT 23 10/30/2023    ALKPHOS 101 10/30/2023    BILITOT 0.6 10/30/2023       LIPID PANEL -   Lab Results   Component Value Date    CHOL 167 10/30/2023    TRIG 90 10/30/2023    HDL 67.4 10/30/2023    CHHDL 2.5 10/30/2023    LDLF 62 06/30/2023    VLDL 18 10/30/2023    NHDL 100 10/30/2023       RENAL FUNCTION PANEL -   Lab Results   Component Value Date    GLUCOSE 83 10/30/2023     10/30/2023    K 4.3 10/30/2023    CL 99 10/30/2023    CO2 31 10/30/2023    ANIONGAP 11 10/30/2023    BUN 14 10/30/2023    CREATININE 1.16 (H) 10/30/2023    CALCIUM 9.5 10/30/2023    ALBUMIN 4.2 10/30/2023        Lab Results   Component Value  Date    HGBA1C 6.1 (H) 10/30/2023       Assessment/Plan   Elevated CAC score  HTN  Palpitations - lasting up to 5 minutes at a time  DM2    Plan:  -increase atorvastatin to 60mg daily  -continue ASA 81mg daily  -carvedilol 12.5mg bid  -hydrochlorothiazide 12.5mg daily  -losartan 100mg daily  -to have BMP and FLP checked in 5/24 by Nelly Lucas  -see me for virtual visit after 2 week holter, ordered for palpitations

## 2024-04-02 NOTE — PATIENT INSTRUCTIONS
Thanks for following up in office today.  1) On your last cholesterol labs your LDL was close to goal.  I would like to see it less than 70.   I am your statin to atorvastatin 40 mg to  1.5 pills  daily.     2) You are  telling me that you are having more episodes that are lasting longer of the heart fluttering, beating fast so we are going to have you wear a monitor for two weeks to see what heart rhythm you are having.     3)  Please continue your cardiac medications as prescribed.  You need to let me know if your diastolic blood pressure is frequently in the tara 80's to 90's.     Follow up with me as a virtual appointment after the holter monitor.  6 month follow up with KRISTIN Guerrero NP.    Let us know when you get your labs done for  your PCP.  If you have any questions, please call [phone] and choose option for Dr. Pang's nurse Nadine Mathur

## 2024-04-03 ENCOUNTER — ANCILLARY PROCEDURE (OUTPATIENT)
Dept: CARDIOLOGY | Facility: CLINIC | Age: 69
End: 2024-04-03
Payer: MEDICARE

## 2024-04-03 DIAGNOSIS — E78.5 HYPERLIPIDEMIA ASSOCIATED WITH TYPE 2 DIABETES MELLITUS (MULTI): ICD-10-CM

## 2024-04-03 DIAGNOSIS — R93.1 ABNORMAL SCREENING CARDIAC CT: ICD-10-CM

## 2024-04-03 DIAGNOSIS — E11.69 HYPERLIPIDEMIA ASSOCIATED WITH TYPE 2 DIABETES MELLITUS (MULTI): ICD-10-CM

## 2024-04-03 DIAGNOSIS — I25.10 ATHEROSCLEROSIS OF NATIVE CORONARY ARTERY OF NATIVE HEART WITHOUT ANGINA PECTORIS: ICD-10-CM

## 2024-04-03 DIAGNOSIS — R00.2 INTERMITTENT PALPITATIONS: ICD-10-CM

## 2024-04-03 DIAGNOSIS — I10 BENIGN ESSENTIAL HYPERTENSION: ICD-10-CM

## 2024-04-03 PROCEDURE — 93248 EXT ECG>7D<15D REV&INTERPJ: CPT | Performed by: INTERNAL MEDICINE

## 2024-04-03 PROCEDURE — 93246 EXT ECG>7D<15D RECORDING: CPT | Performed by: INTERNAL MEDICINE

## 2024-04-04 ENCOUNTER — PATIENT MESSAGE (OUTPATIENT)
Dept: PRIMARY CARE | Facility: CLINIC | Age: 69
End: 2024-04-04
Payer: MEDICARE

## 2024-04-04 DIAGNOSIS — F41.9 ANXIETY: ICD-10-CM

## 2024-04-04 DIAGNOSIS — F32.A DEPRESSION, UNSPECIFIED DEPRESSION TYPE: ICD-10-CM

## 2024-04-04 RX ORDER — AMITRIPTYLINE HYDROCHLORIDE 10 MG/1
30 TABLET, FILM COATED ORAL NIGHTLY
Qty: 270 TABLET | Refills: 1 | Status: SHIPPED | OUTPATIENT
Start: 2024-04-04 | End: 2024-10-01

## 2024-04-04 RX ORDER — BUSPIRONE HYDROCHLORIDE 7.5 MG/1
7.5 TABLET ORAL AS NEEDED
Qty: 30 TABLET | Refills: 2 | Status: SHIPPED | OUTPATIENT
Start: 2024-04-04 | End: 2024-04-04

## 2024-04-04 RX ORDER — BUSPIRONE HYDROCHLORIDE 7.5 MG/1
7.5 TABLET ORAL AS NEEDED
Qty: 30 TABLET | Refills: 2 | Status: SHIPPED | OUTPATIENT
Start: 2024-04-04

## 2024-04-08 ENCOUNTER — APPOINTMENT (OUTPATIENT)
Dept: PRIMARY CARE | Facility: CLINIC | Age: 69
End: 2024-04-08
Payer: MEDICARE

## 2024-04-09 ENCOUNTER — CLINICAL SUPPORT (OUTPATIENT)
Dept: PRIMARY CARE | Facility: CLINIC | Age: 69
End: 2024-04-09
Payer: MEDICARE

## 2024-04-09 DIAGNOSIS — E53.8 VITAMIN B 12 DEFICIENCY: ICD-10-CM

## 2024-04-09 PROCEDURE — 96372 THER/PROPH/DIAG INJ SC/IM: CPT | Performed by: CLINICAL NURSE SPECIALIST

## 2024-04-09 RX ORDER — CYANOCOBALAMIN 1000 UG/ML
1000 INJECTION, SOLUTION INTRAMUSCULAR; SUBCUTANEOUS ONCE
Status: COMPLETED | OUTPATIENT
Start: 2024-04-09 | End: 2024-04-09

## 2024-04-09 RX ADMIN — CYANOCOBALAMIN 1000 MCG: 1000 INJECTION, SOLUTION INTRAMUSCULAR; SUBCUTANEOUS at 10:06

## 2024-04-25 DIAGNOSIS — E11.9 TYPE 2 DIABETES MELLITUS WITHOUT COMPLICATION, WITHOUT LONG-TERM CURRENT USE OF INSULIN (MULTI): Primary | ICD-10-CM

## 2024-04-26 ENCOUNTER — LAB (OUTPATIENT)
Dept: LAB | Facility: LAB | Age: 69
End: 2024-04-26
Payer: MEDICARE

## 2024-04-26 DIAGNOSIS — I73.9 PVD (PERIPHERAL VASCULAR DISEASE) (CMS-HCC): ICD-10-CM

## 2024-04-26 DIAGNOSIS — E66.01 MORBID OBESITY (MULTI): ICD-10-CM

## 2024-04-26 DIAGNOSIS — E55.9 VITAMIN D DEFICIENCY: ICD-10-CM

## 2024-04-26 DIAGNOSIS — E11.69 HYPERLIPIDEMIA ASSOCIATED WITH TYPE 2 DIABETES MELLITUS (MULTI): ICD-10-CM

## 2024-04-26 DIAGNOSIS — Z12.31 VISIT FOR SCREENING MAMMOGRAM: ICD-10-CM

## 2024-04-26 DIAGNOSIS — F32.0 MILD MAJOR DEPRESSION (CMS-HCC): ICD-10-CM

## 2024-04-26 DIAGNOSIS — E78.5 HYPERLIPIDEMIA ASSOCIATED WITH TYPE 2 DIABETES MELLITUS (MULTI): ICD-10-CM

## 2024-04-26 DIAGNOSIS — E53.8 VITAMIN B12 DEFICIENCY: ICD-10-CM

## 2024-04-26 DIAGNOSIS — R11.0 NAUSEA: ICD-10-CM

## 2024-04-26 DIAGNOSIS — I10 BENIGN ESSENTIAL HYPERTENSION: ICD-10-CM

## 2024-04-26 DIAGNOSIS — E11.9 TYPE 2 DIABETES MELLITUS WITHOUT COMPLICATION, WITHOUT LONG-TERM CURRENT USE OF INSULIN (MULTI): ICD-10-CM

## 2024-04-26 DIAGNOSIS — F41.9 ANXIETY: ICD-10-CM

## 2024-04-26 DIAGNOSIS — E03.9 HYPOTHYROIDISM IN ADULT: ICD-10-CM

## 2024-04-26 LAB
25(OH)D3 SERPL-MCNC: 74 NG/ML (ref 30–100)
ALBUMIN SERPL BCP-MCNC: 4.1 G/DL (ref 3.4–5)
ALP SERPL-CCNC: 96 U/L (ref 33–136)
ALT SERPL W P-5'-P-CCNC: 21 U/L (ref 7–45)
ANION GAP SERPL CALC-SCNC: 11 MMOL/L (ref 10–20)
AST SERPL W P-5'-P-CCNC: 17 U/L (ref 9–39)
BILIRUB SERPL-MCNC: 0.5 MG/DL (ref 0–1.2)
BUN SERPL-MCNC: 15 MG/DL (ref 6–23)
CALCIUM SERPL-MCNC: 9.4 MG/DL (ref 8.6–10.3)
CHLORIDE SERPL-SCNC: 101 MMOL/L (ref 98–107)
CHOLEST SERPL-MCNC: 140 MG/DL (ref 0–199)
CHOLESTEROL/HDL RATIO: 2.2
CO2 SERPL-SCNC: 31 MMOL/L (ref 21–32)
CREAT SERPL-MCNC: 1.15 MG/DL (ref 0.5–1.05)
CREAT UR-MCNC: 124.3 MG/DL (ref 20–320)
EGFRCR SERPLBLD CKD-EPI 2021: 52 ML/MIN/1.73M*2
ERYTHROCYTE [DISTWIDTH] IN BLOOD BY AUTOMATED COUNT: 15.4 % (ref 11.5–14.5)
EST. AVERAGE GLUCOSE BLD GHB EST-MCNC: 134 MG/DL
GLUCOSE SERPL-MCNC: 79 MG/DL (ref 74–99)
HBA1C MFR BLD: 6.3 %
HCT VFR BLD AUTO: 37.1 % (ref 36–46)
HDLC SERPL-MCNC: 63.1 MG/DL
HGB BLD-MCNC: 12 G/DL (ref 12–16)
LDLC SERPL CALC-MCNC: 60 MG/DL
MCH RBC QN AUTO: 28.2 PG (ref 26–34)
MCHC RBC AUTO-ENTMCNC: 32.3 G/DL (ref 32–36)
MCV RBC AUTO: 87 FL (ref 80–100)
MICROALBUMIN UR-MCNC: 20.3 MG/L
MICROALBUMIN/CREAT UR: 16.3 UG/MG CREAT
NON HDL CHOLESTEROL: 77 MG/DL (ref 0–149)
NRBC BLD-RTO: 0 /100 WBCS (ref 0–0)
PLATELET # BLD AUTO: 326 X10*3/UL (ref 150–450)
POTASSIUM SERPL-SCNC: 4.4 MMOL/L (ref 3.5–5.3)
PROT SERPL-MCNC: 7.1 G/DL (ref 6.4–8.2)
RBC # BLD AUTO: 4.26 X10*6/UL (ref 4–5.2)
SODIUM SERPL-SCNC: 139 MMOL/L (ref 136–145)
TRIGL SERPL-MCNC: 87 MG/DL (ref 0–149)
TSH SERPL-ACNC: 1.53 MIU/L (ref 0.44–3.98)
VIT B12 SERPL-MCNC: 503 PG/ML (ref 211–911)
VLDL: 17 MG/DL (ref 0–40)
WBC # BLD AUTO: 5.5 X10*3/UL (ref 4.4–11.3)

## 2024-04-26 PROCEDURE — 82570 ASSAY OF URINE CREATININE: CPT

## 2024-04-26 PROCEDURE — 82306 VITAMIN D 25 HYDROXY: CPT

## 2024-04-26 PROCEDURE — 82043 UR ALBUMIN QUANTITATIVE: CPT

## 2024-04-26 PROCEDURE — 80053 COMPREHEN METABOLIC PANEL: CPT

## 2024-04-26 PROCEDURE — 36415 COLL VENOUS BLD VENIPUNCTURE: CPT

## 2024-04-26 PROCEDURE — 83036 HEMOGLOBIN GLYCOSYLATED A1C: CPT

## 2024-04-26 PROCEDURE — 80061 LIPID PANEL: CPT

## 2024-04-26 PROCEDURE — 84443 ASSAY THYROID STIM HORMONE: CPT

## 2024-04-26 PROCEDURE — 85027 COMPLETE CBC AUTOMATED: CPT

## 2024-04-26 PROCEDURE — 82607 VITAMIN B-12: CPT

## 2024-05-01 ENCOUNTER — OFFICE VISIT (OUTPATIENT)
Dept: CARDIOLOGY | Facility: CLINIC | Age: 69
End: 2024-05-01
Payer: MEDICARE

## 2024-05-01 VITALS
HEIGHT: 67 IN | OXYGEN SATURATION: 96 % | HEART RATE: 76 BPM | BODY MASS INDEX: 41.23 KG/M2 | WEIGHT: 262.7 LBS | DIASTOLIC BLOOD PRESSURE: 72 MMHG | SYSTOLIC BLOOD PRESSURE: 115 MMHG

## 2024-05-01 DIAGNOSIS — E78.5 HYPERLIPIDEMIA ASSOCIATED WITH TYPE 2 DIABETES MELLITUS (MULTI): ICD-10-CM

## 2024-05-01 DIAGNOSIS — E11.69 HYPERLIPIDEMIA ASSOCIATED WITH TYPE 2 DIABETES MELLITUS (MULTI): ICD-10-CM

## 2024-05-01 DIAGNOSIS — R93.1 ELEVATED CORONARY ARTERY CALCIUM SCORE: ICD-10-CM

## 2024-05-01 DIAGNOSIS — E11.9 TYPE 2 DIABETES MELLITUS WITHOUT COMPLICATION, WITHOUT LONG-TERM CURRENT USE OF INSULIN (MULTI): Primary | ICD-10-CM

## 2024-05-01 DIAGNOSIS — I10 BENIGN ESSENTIAL HYPERTENSION: ICD-10-CM

## 2024-05-01 PROCEDURE — 1159F MED LIST DOCD IN RCRD: CPT | Performed by: INTERNAL MEDICINE

## 2024-05-01 PROCEDURE — 99214 OFFICE O/P EST MOD 30 MIN: CPT | Performed by: INTERNAL MEDICINE

## 2024-05-01 PROCEDURE — 3008F BODY MASS INDEX DOCD: CPT | Performed by: INTERNAL MEDICINE

## 2024-05-01 PROCEDURE — 4010F ACE/ARB THERAPY RXD/TAKEN: CPT | Performed by: INTERNAL MEDICINE

## 2024-05-01 PROCEDURE — 3074F SYST BP LT 130 MM HG: CPT | Performed by: INTERNAL MEDICINE

## 2024-05-01 PROCEDURE — 1160F RVW MEDS BY RX/DR IN RCRD: CPT | Performed by: INTERNAL MEDICINE

## 2024-05-01 PROCEDURE — 3044F HG A1C LEVEL LT 7.0%: CPT | Performed by: INTERNAL MEDICINE

## 2024-05-01 PROCEDURE — 1036F TOBACCO NON-USER: CPT | Performed by: INTERNAL MEDICINE

## 2024-05-01 PROCEDURE — 1126F AMNT PAIN NOTED NONE PRSNT: CPT | Performed by: INTERNAL MEDICINE

## 2024-05-01 PROCEDURE — 3078F DIAST BP <80 MM HG: CPT | Performed by: INTERNAL MEDICINE

## 2024-05-01 PROCEDURE — 3061F NEG MICROALBUMINURIA REV: CPT | Performed by: INTERNAL MEDICINE

## 2024-05-01 PROCEDURE — 1157F ADVNC CARE PLAN IN RCRD: CPT | Performed by: INTERNAL MEDICINE

## 2024-05-01 PROCEDURE — 3048F LDL-C <100 MG/DL: CPT | Performed by: INTERNAL MEDICINE

## 2024-05-01 ASSESSMENT — ENCOUNTER SYMPTOMS
MUSCULOSKELETAL NEGATIVE: 1
CONSTITUTIONAL NEGATIVE: 1
RESPIRATORY NEGATIVE: 1
PSYCHIATRIC NEGATIVE: 1
ALLERGIC/IMMUNOLOGIC NEGATIVE: 1
LOSS OF SENSATION IN FEET: 0
DEPRESSION: 0
HEMATOLOGIC/LYMPHATIC NEGATIVE: 1
CARDIOVASCULAR NEGATIVE: 1
OCCASIONAL FEELINGS OF UNSTEADINESS: 0
NEUROLOGICAL NEGATIVE: 1
ENDOCRINE NEGATIVE: 1
EYES NEGATIVE: 1
GASTROINTESTINAL NEGATIVE: 1

## 2024-05-01 ASSESSMENT — PAIN SCALES - GENERAL: PAINLEVEL: 0-NO PAIN

## 2024-05-01 ASSESSMENT — PATIENT HEALTH QUESTIONNAIRE - PHQ9
2. FEELING DOWN, DEPRESSED OR HOPELESS: NOT AT ALL
SUM OF ALL RESPONSES TO PHQ9 QUESTIONS 1 AND 2: 0
1. LITTLE INTEREST OR PLEASURE IN DOING THINGS: NOT AT ALL

## 2024-05-01 ASSESSMENT — COLUMBIA-SUICIDE SEVERITY RATING SCALE - C-SSRS
1. IN THE PAST MONTH, HAVE YOU WISHED YOU WERE DEAD OR WISHED YOU COULD GO TO SLEEP AND NOT WAKE UP?: NO
2. HAVE YOU ACTUALLY HAD ANY THOUGHTS OF KILLING YOURSELF?: NO
6. HAVE YOU EVER DONE ANYTHING, STARTED TO DO ANYTHING, OR PREPARED TO DO ANYTHING TO END YOUR LIFE?: NO

## 2024-05-01 NOTE — PROGRESS NOTES
[facility] V[facility]-Metabolic Disease ([facility]) Note    Reason for Visit: Follow up visit  Referring Clinician: No ref. provider found     History of Present Illness: Mrs. Bulter is a 68-year-old woman with multiple cardiovascular risk factors including hypertension, hyperlipidemia, type 2 diabetes mellitus on insulin, obesity, and elevated coronary artery calcium score referred by her primary cardiologist to the [facility] program for cardiovascular risk factor optimization and is here for a follow-up visit.  Since her last visit she has been doing well without any major cardiovascular issues or health events.  She switched from Trulicity to tirzepatide at the 2.5 mg weekly dose and is tolerating this well without any adverse effects.  Her repeat laboratory studies show well-controlled glycemia and lipids.  Her blood pressure is also well-controlled today.  She had some palpitations so her primary cardiologist ordered a Zio patch which did not show any significant arrhythmias.    Past Medical History:  She has a past medical history of Anxiety disorder, unspecified, Body mass index (BMI) 34.0-34.9, adult (10/16/2019), Body mass index (BMI) 38.0-38.9, adult (09/21/2021), Body mass index (BMI) 39.0-39.9, adult (01/13/2021), Cellulitis of unspecified part of limb (01/13/2021), COVID-19 (01/13/2021), DM2 (diabetes mellitus, type 2) (Multi), Hyperlipidemia, Hypertension, Major depressive disorder, single episode, in full remission (CMS-HCC) (01/13/2021), Major depressive disorder, single episode, moderate (Multi) (07/22/2022), Personal history of other diseases of the digestive system (08/18/2020), Personal history of other diseases of the musculoskeletal system and connective tissue, Personal history of other diseases of the nervous system and sense organs, Personal history of other endocrine, nutritional and metabolic disease, and Personal history of other specified conditions.    Past  Surgical History:  She has a past surgical history that includes  section, classic (2017) and Cholecystectomy (2017).    Social History:  She reports that she has never smoked. She has never used smokeless tobacco. She reports that she does not drink alcohol and does not use drugs.    Family History:  Family History   Problem Relation Name Age of Onset    Cancer Father      Hypertension Father      Heart disease Father         Allergies:  Patient has no known allergies.    Outpatient Medications:  Current Outpatient Medications   Medication Instructions    amitriptyline (ELAVIL) 30 mg, oral, Nightly    aspirin 81 mg chewable tablet 1 tablet, oral, Daily    atorvastatin (LIPITOR) 60 mg, oral, Nightly    blood sugar diagnostic strip 1 strip, miscellaneous, Daily    blood-glucose meter misc Check blood sugars once daily    buPROPion XL (WELLBUTRIN XL) 300 mg, oral, Daily    busPIRone (BUSPAR) 7.5 mg, oral, As needed    carvedilol (COREG) 12.5 mg, oral, 2 times daily with meals    cholecalciferol (VITAMIN D3) 5,000 Units, oral, Daily    hydroCHLOROthiazide (MICROZIDE) 12.5 mg, oral, Daily    lancets misc Check blood sugars daily    levothyroxine (SYNTHROID, LEVOXYL) 137 mcg, oral, Daily    losartan (COZAAR) 100 mg, oral, Daily    metFORMIN XR (GLUCOPHAGE-XR) 750 mg, oral, Daily, as directed    Mounjaro 2.5 mg, subcutaneous, Once Weekly    omeprazole (PRILOSEC) 40 mg, oral, Daily    OneTouch Ultra Test strip USE 1 STRIP TO CHECK GLUCOSE TWICE DAILY       Review of Systems:  Review of Systems   Constitutional: Negative.   HENT: Negative.     Eyes: Negative.    Cardiovascular: Negative.    Respiratory: Negative.     Endocrine: Negative.    Hematologic/Lymphatic: Negative.    Skin: Negative.    Musculoskeletal: Negative.    Gastrointestinal: Negative.    Genitourinary: Negative.    Neurological: Negative.    Psychiatric/Behavioral: Negative.     Allergic/Immunologic: Negative.        Last Recorded  "Vitals:  Vitals:    05/01/24 1133   BP: 115/72   BP Location: Left arm   Patient Position: Sitting   BP Cuff Size: Large adult   Pulse: 76   SpO2: 96%   Weight: 119 kg (262 lb 11.2 oz)   Height: 1.702 m (5' 7\")       Physical Examination:  General: Well appearing, well-nourished, in no acute distress.  HEENT: Normocephalic atraumatic, pupils equal and reactive to light, extraocular muscles intact, no conjunctival injection, oropharynx clear without exudates.  Neck: Normal carotid arterial pulses, no arterial bruits, no thyromegaly.  Cardiac: Regular rhythm and normal heart rate.  S1, S2 present and normal.  No murmurs, rubs, or gallops.  PMI is nondisplaced. Jugular venous pulsations are normal.  Pulmonary: Normal breath sounds, no increased work of breathing, no wheezes or crackles.  GI: Normal bowel sounds, abdominal aorta not enlarged, no hepatosplenomegaly, no abdominal bruits.  Lower extremities: No cyanosis, clubbing, or edema.  No xanthelasma present. Normal distal pulses.  Skin: Skin intact. No significant rashes or lesions present.  Neuro: Alert and oriented x 3, normal attention and cognition, no focal motor or sensory neurologic deficits.  Psych: Normal affect and mood.  Musculoskeletal: Normal gait normal muscle tone.    Laboratory Studies:  Lab Results   Component Value Date    GLUCOSE 79 04/26/2024    CALCIUM 9.4 04/26/2024     04/26/2024    K 4.4 04/26/2024    CO2 31 04/26/2024     04/26/2024    BUN 15 04/26/2024    CREATININE 1.15 (H) 04/26/2024     Lab Results   Component Value Date    ALT 21 04/26/2024    AST 17 04/26/2024    ALKPHOS 96 04/26/2024    BILITOT 0.5 04/26/2024     Results from last 7 days   Lab Units 04/26/24  1610   CHOLESTEROL mg/dL 140   TRIGLYCERIDES mg/dL 87   HDL mg/dL 63.1     Lab Results   Component Value Date    CHOL 140 04/26/2024    CHOL 167 10/30/2023    CHOL 144 06/30/2023     Lab Results   Component Value Date    HDL 63.1 04/26/2024    HDL 67.4 10/30/2023    " "HDL 59.2 06/30/2023     Lab Results   Component Value Date    LDLCALC 60 04/26/2024    LDLCALC 82 10/30/2023     Lab Results   Component Value Date    TRIG 87 04/26/2024    TRIG 90 10/30/2023    TRIG 116 06/30/2023     No components found for: \"CHOLHDL\"  Lab Results   Component Value Date    HGBA1C 6.3 (H) 04/26/2024       Assessment and Plan:  Problem List Items Addressed This Visit          Cardiac and Vasculature    Benign essential hypertension    Elevated coronary artery calcium score    Hyperlipidemia associated with type 2 diabetes mellitus (Multi)       Endocrine/Metabolic    Type 2 diabetes mellitus without complication, without long-term current use of insulin (Multi) - Primary     Mrs. Butler is a 68-year-old woman with multiple cardiovascular risk factors including hypertension, hyperlipidemia, type 2 diabetes mellitus on insulin, obesity, and elevated coronary artery calcium score referred by her primary cardiologist to the [facility] program for cardiovascular risk factor optimization and is here for a follow-up visit. She is doing well without any cardiovascular symptoms. Her blood pressure, lipids, A1c, and UACR are all at goal. I would not recommend any changes to her medical regimen today. She will follow-up with her primary care physician and I will see her again in 1 year here in the office for routine follow-up.     I spent 35 minutes of face-to-face time with the patient, with more than 50% of that time spent in counseling and/or coordination of care.    Praveen Marcus MD, SARA, [facility]  Director,  [facility] Cardiovascular Prevention  Director,  [facility] Program  Associate Professor of Medicine  [employer]      "

## 2024-05-02 ENCOUNTER — PATIENT MESSAGE (OUTPATIENT)
Dept: PRIMARY CARE | Facility: CLINIC | Age: 69
End: 2024-05-02
Payer: MEDICARE

## 2024-05-02 DIAGNOSIS — R11.0 NAUSEA: Primary | ICD-10-CM

## 2024-05-02 RX ORDER — ONDANSETRON 4 MG/1
4 TABLET, FILM COATED ORAL EVERY 8 HOURS PRN
Qty: 20 TABLET | Refills: 0 | Status: SHIPPED | OUTPATIENT
Start: 2024-05-02 | End: 2024-05-07 | Stop reason: SDUPTHER

## 2024-05-03 ENCOUNTER — TELEMEDICINE (OUTPATIENT)
Dept: CARDIOLOGY | Facility: CLINIC | Age: 69
End: 2024-05-03
Payer: MEDICARE

## 2024-05-03 DIAGNOSIS — E78.5 HYPERLIPIDEMIA ASSOCIATED WITH TYPE 2 DIABETES MELLITUS (MULTI): ICD-10-CM

## 2024-05-03 DIAGNOSIS — R93.1 ABNORMAL SCREENING CARDIAC CT: ICD-10-CM

## 2024-05-03 DIAGNOSIS — E11.69 HYPERLIPIDEMIA ASSOCIATED WITH TYPE 2 DIABETES MELLITUS (MULTI): ICD-10-CM

## 2024-05-03 DIAGNOSIS — I10 BENIGN ESSENTIAL HYPERTENSION: Primary | ICD-10-CM

## 2024-05-03 DIAGNOSIS — R93.1 ELEVATED CORONARY ARTERY CALCIUM SCORE: ICD-10-CM

## 2024-05-03 PROCEDURE — 3048F LDL-C <100 MG/DL: CPT | Performed by: INTERNAL MEDICINE

## 2024-05-03 PROCEDURE — 99213 OFFICE O/P EST LOW 20 MIN: CPT | Performed by: INTERNAL MEDICINE

## 2024-05-03 PROCEDURE — 3061F NEG MICROALBUMINURIA REV: CPT | Performed by: INTERNAL MEDICINE

## 2024-05-03 PROCEDURE — 1157F ADVNC CARE PLAN IN RCRD: CPT | Performed by: INTERNAL MEDICINE

## 2024-05-03 PROCEDURE — 1159F MED LIST DOCD IN RCRD: CPT | Performed by: INTERNAL MEDICINE

## 2024-05-03 PROCEDURE — 3044F HG A1C LEVEL LT 7.0%: CPT | Performed by: INTERNAL MEDICINE

## 2024-05-03 PROCEDURE — 3008F BODY MASS INDEX DOCD: CPT | Performed by: INTERNAL MEDICINE

## 2024-05-03 PROCEDURE — 4010F ACE/ARB THERAPY RXD/TAKEN: CPT | Performed by: INTERNAL MEDICINE

## 2024-05-03 PROCEDURE — 1160F RVW MEDS BY RX/DR IN RCRD: CPT | Performed by: INTERNAL MEDICINE

## 2024-05-03 PROCEDURE — 1036F TOBACCO NON-USER: CPT | Performed by: INTERNAL MEDICINE

## 2024-05-03 NOTE — PROGRESS NOTES
Chief Complaint:   No chief complaint on file.     History Of Present Illness:    Elizabet Butler is a 68 y.o. female presenting for virtual visit with me.    Elizabet presents for virtual visit to review her holter result.  She admits to a recent episode of dizziness after a meal and standing up - she had lightheadeness, but no syncope.    Her holter monitor demonstrated baseline SR and one episode of pSVT - reportedly asymptomatic.  She did have one diary episode that correlated with sinus rhythm.      ROS:  The remainder of the review of systems was obtained, as was negative as pertains to the chief complaint.        Last Recorded Vitals:  There were no vitals filed for this visit.    Past Medical History:  She has a past medical history of Anxiety disorder, unspecified, Body mass index (BMI) 34.0-34.9, adult (10/16/2019), Body mass index (BMI) 38.0-38.9, adult (2021), Body mass index (BMI) 39.0-39.9, adult (2021), Cellulitis of unspecified part of limb (2021), COVID-19 (2021), DM2 (diabetes mellitus, type 2) (Multi), Hyperlipidemia, Hypertension, Major depressive disorder, single episode, in full remission (CMS-HCC) (2021), Major depressive disorder, single episode, moderate (Multi) (2022), Personal history of other diseases of the digestive system (2020), Personal history of other diseases of the musculoskeletal system and connective tissue, Personal history of other diseases of the nervous system and sense organs, Personal history of other endocrine, nutritional and metabolic disease, and Personal history of other specified conditions.    Past Surgical History:  She has a past surgical history that includes  section, classic (2017) and Cholecystectomy (2017).      Social History:  She reports that she has never smoked. She has never used smokeless tobacco. She reports that she does not drink alcohol and does not use drugs.    Family History:  Family  History   Problem Relation Name Age of Onset    Cancer Father      Hypertension Father      Heart disease Father          Allergies:  Patient has no known allergies.    Outpatient Medications:  Current Outpatient Medications   Medication Instructions    amitriptyline (ELAVIL) 30 mg, oral, Nightly    aspirin 81 mg chewable tablet 1 tablet, oral, Daily    atorvastatin (LIPITOR) 60 mg, oral, Nightly    blood sugar diagnostic strip 1 strip, miscellaneous, Daily    blood-glucose meter misc Check blood sugars once daily    buPROPion XL (WELLBUTRIN XL) 300 mg, oral, Daily    busPIRone (BUSPAR) 7.5 mg, oral, As needed    carvedilol (COREG) 12.5 mg, oral, 2 times daily with meals    cholecalciferol (VITAMIN D3) 5,000 Units, oral, Daily    hydroCHLOROthiazide (MICROZIDE) 12.5 mg, oral, Daily    lancets misc Check blood sugars daily    levothyroxine (SYNTHROID, LEVOXYL) 137 mcg, oral, Daily    losartan (COZAAR) 100 mg, oral, Daily    metFORMIN XR (GLUCOPHAGE-XR) 750 mg, oral, Daily, as directed    Mounjaro 2.5 mg, subcutaneous, Once Weekly    omeprazole (PRILOSEC) 40 mg, oral, Daily    ondansetron (ZOFRAN) 4 mg, oral, Every 8 hours PRN    OneTouch Ultra Test strip USE 1 STRIP TO CHECK GLUCOSE TWICE DAILY       Physical Exam:  Physical Exam  HENT:      Head: Normocephalic.      Mouth/Throat:      Mouth: Mucous membranes are moist.   Eyes:      Extraocular Movements: Extraocular movements intact.   Cardiovascular:      Comments: No definite JVD  Pulmonary:      Effort: Pulmonary effort is normal.   Musculoskeletal:      Cervical back: Neck supple.   Skin:     Comments: No obvious rashes on visualized skin areas   Neurological:      Mental Status: She is alert and oriented to person, place, and time.   Psychiatric:         Mood and Affect: Mood normal.            Last Labs:  CBC -  Lab Results   Component Value Date    WBC 5.5 04/26/2024    HGB 12.0 04/26/2024    HCT 37.1 04/26/2024    MCV 87 04/26/2024     04/26/2024        CMP -  Lab Results   Component Value Date    CALCIUM 9.4 04/26/2024    PROT 7.1 04/26/2024    ALBUMIN 4.1 04/26/2024    AST 17 04/26/2024    ALT 21 04/26/2024    ALKPHOS 96 04/26/2024    BILITOT 0.5 04/26/2024       LIPID PANEL -   Lab Results   Component Value Date    CHOL 140 04/26/2024    TRIG 87 04/26/2024    HDL 63.1 04/26/2024    CHHDL 2.2 04/26/2024    LDLF 62 06/30/2023    VLDL 17 04/26/2024    NHDL 77 04/26/2024       RENAL FUNCTION PANEL -   Lab Results   Component Value Date    GLUCOSE 79 04/26/2024     04/26/2024    K 4.4 04/26/2024     04/26/2024    CO2 31 04/26/2024    ANIONGAP 11 04/26/2024    BUN 15 04/26/2024    CREATININE 1.15 (H) 04/26/2024    CALCIUM 9.4 04/26/2024    ALBUMIN 4.1 04/26/2024        Lab Results   Component Value Date    HGBA1C 6.3 (H) 04/26/2024       Last Cardiology Tests:  ECG:  ECG 12 Lead 04/02/2024    Cardiac Imaging:  CT heart calcium scoring wo IV contrast     Assessment/Plan   Elevated CAC score  HTN  Palpitations - lasting up to 5 minutes at a time  DM2     Had holter - one episode pSVT.    Did have an episode of dizziness recently.    Advised to take half carvedilol twice a day and see if this helps with dizziness episodes.    Plan:  -increase atorvastatin to 60mg daily  -continue ASA 81mg daily  -carvedilol 12.5mg bid > advised to take half a pill bid   -hydrochlorothiazide 12.5mg daily  -losartan 100mg daily  -to have BMP and FLP checked in 5/24 by Nelly Lucas  -see me for virtual visit in 3 mos    Elaina Pang MD

## 2024-05-03 NOTE — PATIENT INSTRUCTIONS
Thank you for following up with us in office today.    1)  Try taking half of your carvedilol pill twice a day (instead of the whole pill) and see if this helps reduce your dizzy episodes.    2)  Please continue the remainder of your medications as prescribed    See Dr. Pang for a virtual follow up in 3 months  If you have any questions, please contact the office and leave a message for Dr. Pang's nurse, Nadine Mathur.  [phone]  Please follow up with  [facility] in 6 months  Please follow up with Dr. Pang in one year

## 2024-05-07 ENCOUNTER — OFFICE VISIT (OUTPATIENT)
Dept: PRIMARY CARE | Facility: CLINIC | Age: 69
End: 2024-05-07
Payer: MEDICARE

## 2024-05-07 VITALS
BODY MASS INDEX: 41.44 KG/M2 | SYSTOLIC BLOOD PRESSURE: 120 MMHG | DIASTOLIC BLOOD PRESSURE: 60 MMHG | HEART RATE: 83 BPM | HEIGHT: 67 IN | WEIGHT: 264 LBS

## 2024-05-07 DIAGNOSIS — E78.5 HYPERLIPIDEMIA ASSOCIATED WITH TYPE 2 DIABETES MELLITUS (MULTI): ICD-10-CM

## 2024-05-07 DIAGNOSIS — Z12.31 VISIT FOR SCREENING MAMMOGRAM: ICD-10-CM

## 2024-05-07 DIAGNOSIS — E11.69 HYPERLIPIDEMIA ASSOCIATED WITH TYPE 2 DIABETES MELLITUS (MULTI): ICD-10-CM

## 2024-05-07 DIAGNOSIS — E66.01 MORBID OBESITY (MULTI): ICD-10-CM

## 2024-05-07 DIAGNOSIS — R11.0 NAUSEA: ICD-10-CM

## 2024-05-07 DIAGNOSIS — F32.0 MILD MAJOR DEPRESSION (CMS-HCC): ICD-10-CM

## 2024-05-07 DIAGNOSIS — E53.8 VITAMIN B12 DEFICIENCY: ICD-10-CM

## 2024-05-07 DIAGNOSIS — I73.9 PVD (PERIPHERAL VASCULAR DISEASE) (CMS-HCC): ICD-10-CM

## 2024-05-07 DIAGNOSIS — E11.9 TYPE 2 DIABETES MELLITUS WITHOUT COMPLICATION, WITHOUT LONG-TERM CURRENT USE OF INSULIN (MULTI): ICD-10-CM

## 2024-05-07 DIAGNOSIS — I10 BENIGN ESSENTIAL HYPERTENSION: ICD-10-CM

## 2024-05-07 DIAGNOSIS — E03.9 HYPOTHYROIDISM IN ADULT: ICD-10-CM

## 2024-05-07 DIAGNOSIS — Z78.0 POST-MENOPAUSAL: ICD-10-CM

## 2024-05-07 DIAGNOSIS — Z00.00 MEDICARE ANNUAL WELLNESS VISIT, SUBSEQUENT: Primary | ICD-10-CM

## 2024-05-07 PROCEDURE — 3061F NEG MICROALBUMINURIA REV: CPT | Performed by: CLINICAL NURSE SPECIALIST

## 2024-05-07 PROCEDURE — 3078F DIAST BP <80 MM HG: CPT | Performed by: CLINICAL NURSE SPECIALIST

## 2024-05-07 PROCEDURE — 1160F RVW MEDS BY RX/DR IN RCRD: CPT | Performed by: CLINICAL NURSE SPECIALIST

## 2024-05-07 PROCEDURE — G0444 DEPRESSION SCREEN ANNUAL: HCPCS | Performed by: CLINICAL NURSE SPECIALIST

## 2024-05-07 PROCEDURE — RXMED WILLOW AMBULATORY MEDICATION CHARGE

## 2024-05-07 PROCEDURE — 1158F ADVNC CARE PLAN TLK DOCD: CPT | Performed by: CLINICAL NURSE SPECIALIST

## 2024-05-07 PROCEDURE — 1159F MED LIST DOCD IN RCRD: CPT | Performed by: CLINICAL NURSE SPECIALIST

## 2024-05-07 PROCEDURE — 96372 THER/PROPH/DIAG INJ SC/IM: CPT | Performed by: CLINICAL NURSE SPECIALIST

## 2024-05-07 PROCEDURE — 4010F ACE/ARB THERAPY RXD/TAKEN: CPT | Performed by: CLINICAL NURSE SPECIALIST

## 2024-05-07 PROCEDURE — 3008F BODY MASS INDEX DOCD: CPT | Performed by: CLINICAL NURSE SPECIALIST

## 2024-05-07 PROCEDURE — 99214 OFFICE O/P EST MOD 30 MIN: CPT | Performed by: CLINICAL NURSE SPECIALIST

## 2024-05-07 PROCEDURE — 3074F SYST BP LT 130 MM HG: CPT | Performed by: CLINICAL NURSE SPECIALIST

## 2024-05-07 PROCEDURE — 1157F ADVNC CARE PLAN IN RCRD: CPT | Performed by: CLINICAL NURSE SPECIALIST

## 2024-05-07 PROCEDURE — G0439 PPPS, SUBSEQ VISIT: HCPCS | Performed by: CLINICAL NURSE SPECIALIST

## 2024-05-07 PROCEDURE — 1170F FXNL STATUS ASSESSED: CPT | Performed by: CLINICAL NURSE SPECIALIST

## 2024-05-07 PROCEDURE — 1123F ACP DISCUSS/DSCN MKR DOCD: CPT | Performed by: CLINICAL NURSE SPECIALIST

## 2024-05-07 PROCEDURE — 3048F LDL-C <100 MG/DL: CPT | Performed by: CLINICAL NURSE SPECIALIST

## 2024-05-07 PROCEDURE — 3044F HG A1C LEVEL LT 7.0%: CPT | Performed by: CLINICAL NURSE SPECIALIST

## 2024-05-07 PROCEDURE — 1036F TOBACCO NON-USER: CPT | Performed by: CLINICAL NURSE SPECIALIST

## 2024-05-07 RX ORDER — TIRZEPATIDE 2.5 MG/.5ML
5 INJECTION, SOLUTION SUBCUTANEOUS
Qty: 2 ML | Refills: 2 | Status: SHIPPED | OUTPATIENT
Start: 2024-05-07 | End: 2024-05-07 | Stop reason: ALTCHOICE

## 2024-05-07 RX ORDER — TIRZEPATIDE 5 MG/.5ML
5 INJECTION, SOLUTION SUBCUTANEOUS
Qty: 3 ML | Refills: 2 | Status: SHIPPED | OUTPATIENT
Start: 2024-05-12

## 2024-05-07 RX ORDER — ONDANSETRON 4 MG/1
4 TABLET, FILM COATED ORAL EVERY 8 HOURS PRN
Qty: 20 TABLET | Refills: 2 | Status: SHIPPED | OUTPATIENT
Start: 2024-05-07 | End: 2024-05-14

## 2024-05-07 RX ORDER — TIRZEPATIDE 2.5 MG/.5ML
5 INJECTION, SOLUTION SUBCUTANEOUS
Qty: 2 ML | Refills: 2 | Status: SHIPPED | OUTPATIENT
Start: 2024-05-07 | End: 2024-05-07 | Stop reason: SDUPTHER

## 2024-05-07 RX ORDER — CYANOCOBALAMIN 1000 UG/ML
1000 INJECTION, SOLUTION INTRAMUSCULAR; SUBCUTANEOUS ONCE
Status: COMPLETED | OUTPATIENT
Start: 2024-05-07 | End: 2024-05-07

## 2024-05-07 RX ADMIN — CYANOCOBALAMIN 1000 MCG: 1000 INJECTION, SOLUTION INTRAMUSCULAR; SUBCUTANEOUS at 10:12

## 2024-05-07 ASSESSMENT — ENCOUNTER SYMPTOMS
DEPRESSION: 0
UNEXPECTED WEIGHT CHANGE: 0
NAUSEA: 0
BLOOD IN STOOL: 0
EYE PAIN: 0
BRUISES/BLEEDS EASILY: 0
DIARRHEA: 0
CONFUSION: 0
JOINT SWELLING: 0
CHEST TIGHTNESS: 0
CONSTIPATION: 0
HEMATURIA: 0
FEVER: 0
WHEEZING: 0
SHORTNESS OF BREATH: 0
DIZZINESS: 0
PALPITATIONS: 0
VOMITING: 0
COUGH: 0
TROUBLE SWALLOWING: 0
LOSS OF SENSATION IN FEET: 0
OCCASIONAL FEELINGS OF UNSTEADINESS: 0
HEADACHES: 0
POLYDIPSIA: 0
MYALGIAS: 0
ABDOMINAL PAIN: 0
ACTIVITY CHANGE: 0
SORE THROAT: 0
ARTHRALGIAS: 0
DYSURIA: 0
NECK PAIN: 0
FLANK PAIN: 0
FATIGUE: 0
PHOTOPHOBIA: 0
SLEEP DISTURBANCE: 0
CHILLS: 0
SEIZURES: 0
APPETITE CHANGE: 0
BACK PAIN: 0
WOUND: 0

## 2024-05-07 ASSESSMENT — ACTIVITIES OF DAILY LIVING (ADL)
TAKING_MEDICATION: INDEPENDENT
DRESSING: INDEPENDENT
DOING_HOUSEWORK: INDEPENDENT
GROCERY_SHOPPING: INDEPENDENT
BATHING: INDEPENDENT
MANAGING_FINANCES: INDEPENDENT

## 2024-05-07 ASSESSMENT — PATIENT HEALTH QUESTIONNAIRE - PHQ9
SUM OF ALL RESPONSES TO PHQ9 QUESTIONS 1 AND 2: 0
1. LITTLE INTEREST OR PLEASURE IN DOING THINGS: NOT AT ALL
2. FEELING DOWN, DEPRESSED OR HOPELESS: NOT AT ALL

## 2024-05-07 ASSESSMENT — COLUMBIA-SUICIDE SEVERITY RATING SCALE - C-SSRS
6. HAVE YOU EVER DONE ANYTHING, STARTED TO DO ANYTHING, OR PREPARED TO DO ANYTHING TO END YOUR LIFE?: NO
2. HAVE YOU ACTUALLY HAD ANY THOUGHTS OF KILLING YOURSELF?: NO
1. IN THE PAST MONTH, HAVE YOU WISHED YOU WERE DEAD OR WISHED YOU COULD GO TO SLEEP AND NOT WAKE UP?: NO

## 2024-05-07 NOTE — PROGRESS NOTES
Subjective   Reason for Visit: Elizabet Butler is an 68 y.o. female here for a Medicare Wellness visit.          Reviewed all medications by prescribing practitioner or clinical pharmacist (such as prescriptions, OTCs, herbal therapies and supplements) and documented in the medical record.    HPI    Here today as a follow up appointment. Discuss results of blood work. Due for Medicare Wellness.      Thyroid had been uncontrolled. Improved with restarting the oral medication.      The patient states she has been doing well with her Type II Diabetes control since the last visit. Insurance did not cover Mounjaro but was able to restart. Did not tolerate Ozempic. Was tolerating Trulicity better but benefited better from Mounjaro. Restarted Mounjaro and tolerating well.      Comorbid Illnesses: hypertension, hyperlipidemia and obesity.   Disease Course and Complications:. there have been no previous episodes of diabetic ketoacidosis. there have been no previous hospitalizations. She has no known diabetic complications. She has no significant interval events.      Following with [facility]. Following with Cardiology. Recent OV.      Increased stressors. Interested in adjusting her medications. Has continued on Amitriptyline and Wellbutrin. Buspar PRN.     Patient Care Team:  PIETRO Carrillo as PCP - General (Internal Medicine)  PIETRO Carrillo as PCP - [employer] Advantage PCP  Elaina Pang MD as Consulting Physician (Cardiology)  Praveen Marcus MD as Consulting Physician (Cardiology)  Raphael Franklin DO as Surgeon (Gastroenterology)     Review of Systems   Constitutional:  Negative for activity change, appetite change, chills, fatigue, fever and unexpected weight change.   HENT:  Negative for ear pain, hearing loss, nosebleeds, sore throat, tinnitus and trouble swallowing.    Eyes:  Negative for photophobia, pain and visual disturbance.   Respiratory:  Negative for cough, chest tightness,  "shortness of breath and wheezing.    Cardiovascular:  Negative for chest pain, palpitations and leg swelling.   Gastrointestinal:  Negative for abdominal pain, blood in stool, constipation, diarrhea, nausea and vomiting.   Endocrine: Negative for cold intolerance, heat intolerance, polydipsia and polyuria.   Genitourinary:  Negative for dysuria, flank pain and hematuria.   Musculoskeletal:  Negative for arthralgias, back pain, joint swelling, myalgias and neck pain.   Skin:  Negative for pallor, rash and wound.   Allergic/Immunologic: Negative for immunocompromised state.   Neurological:  Negative for dizziness, seizures and headaches.   Hematological:  Does not bruise/bleed easily.   Psychiatric/Behavioral:  Negative for confusion and sleep disturbance.        Objective   Vitals:  /60 (BP Location: Right arm, Patient Position: Sitting)   Pulse 83   Ht 1.702 m (5' 7\")   Wt 120 kg (264 lb)   LMP  (LMP Unknown)   BMI 41.35 kg/m²       Physical Exam  Vitals and nursing note reviewed.   Constitutional:       General: She is not in acute distress.     Appearance: Normal appearance.   HENT:      Head: Normocephalic.      Nose: Nose normal.   Eyes:      Conjunctiva/sclera: Conjunctivae normal.   Neck:      Vascular: No carotid bruit.   Cardiovascular:      Rate and Rhythm: Normal rate and regular rhythm.      Pulses: Normal pulses.      Heart sounds: Normal heart sounds.   Pulmonary:      Effort: Pulmonary effort is normal.      Breath sounds: Normal breath sounds.   Abdominal:      General: Bowel sounds are normal.      Palpations: Abdomen is soft.   Musculoskeletal:         General: Normal range of motion.      Cervical back: Normal range of motion.   Skin:     General: Skin is warm and dry.   Neurological:      Mental Status: She is alert and oriented to person, place, and time. Mental status is at baseline.   Psychiatric:         Mood and Affect: Mood normal.         Behavior: Behavior normal. "         Assessment/Plan   Problem List Items Addressed This Visit       Anxiety    Benign essential hypertension    Hyperlipidemia associated with type 2 diabetes mellitus (Multi)    Hypothyroidism in adult    Mild major depression (CMS-HCC)    Morbid obesity (Multi)    PVD (peripheral vascular disease) (CMS-HCC)    Type 2 diabetes mellitus without complication, without long-term current use of insulin (Multi)    Vitamin D deficiency     Other Visit Diagnoses       Nausea        Vitamin B12 deficiency        Visit for screening mammogram                 Reviewed most recent lab work available.      Hypothyroidism. Continue Levothyroxine dosage. Reevaluate with next lab work.   Abnormal Calcium score. She had normal stress test in the summer of 2017. She has been seen by cardiology and is now on a statin, baby aspirin a beta blocker. She is asymptomatic. Yearly follow-up with cardiology.   PVD: Stable at this time. continue to monitor.   Hypertension. Blood pressures normally well controlled. Will continue current medication. Patient previously discontinued ARB secondary to dizziness.   Insomnia, Anxiety, Depression, History of migraine variant. Doing well continue carvedilol and amitriptyline. Increased Amitriptyline with increased Depression. Continue Wellbutrin, discussed adjusting dosage as she is not sure that she still needs. Buspirone. Reevaluate at follow up appointment.   Diabetes mellitus type 2: A1C 6.3%. Did not tolerate Ozempic. Initially continue Metformin, monitor blood sugars to discontinue. Continue to monitor hemoglobin A1c. Urine Albumin: April 2024. ARB discontinued by patient as above secondary to dizziness. No diabetic complications. Diabetic eye exam 2022, patient reminded to schedule this years exam. Restarted Mounjaro.   Mild dyslipidemia. Continue atorvastatin 40 mg daily. Repeat lipid panel yearly is ordered per cardiology.  Obesity: BMI: 41.35. Lifestyle changes as noted above.  Vitamin  B12 Deficiency: B12 injection today and then will start Vitamin B12 OTC. Reevaluate with next lab work.   Medicare Wellness: Routine and age appropriate recommendations discussed with the patient today and patient verbalized understanding of the recommendations.  Questions answered.  Age appropriate immunizations and preventative screenings discussed with the patient and ordered as appropriate. Labs updated and ordered as indicated. Recommend healthy diet and daily exercise to maintain healthy body weight.      No further Pap smears required secondary to age.   DEXA scan May 2019. Ordered for 2024.   Normal mammogram January 2023, negative. Ordered for 2024.   Colonoscopy: December 2023, plan to repeat in 5 years.   Prevnar 20: August 2023.  Pneumovax: September 2019.   Flu Vaccine: November 2023.   Shingrix: October 2020, March 2021.   Medicare Wellness: May 2024.   Discussed Tdap.

## 2024-05-08 ENCOUNTER — PHARMACY VISIT (OUTPATIENT)
Dept: PHARMACY | Facility: CLINIC | Age: 69
End: 2024-05-08
Payer: MEDICARE

## 2024-05-10 ENCOUNTER — HOSPITAL ENCOUNTER (OUTPATIENT)
Dept: RADIOLOGY | Facility: CLINIC | Age: 69
Discharge: HOME | End: 2024-05-10
Payer: MEDICARE

## 2024-05-10 VITALS — WEIGHT: 262.35 LBS | BODY MASS INDEX: 41.18 KG/M2 | HEIGHT: 67 IN

## 2024-05-10 DIAGNOSIS — Z78.0 POST-MENOPAUSAL: ICD-10-CM

## 2024-05-10 DIAGNOSIS — Z12.31 VISIT FOR SCREENING MAMMOGRAM: ICD-10-CM

## 2024-05-10 PROCEDURE — 77080 DXA BONE DENSITY AXIAL: CPT

## 2024-05-10 PROCEDURE — 77067 SCR MAMMO BI INCL CAD: CPT | Performed by: RADIOLOGY

## 2024-05-10 PROCEDURE — 77067 SCR MAMMO BI INCL CAD: CPT

## 2024-05-10 PROCEDURE — 77063 BREAST TOMOSYNTHESIS BI: CPT | Performed by: RADIOLOGY

## 2024-05-10 PROCEDURE — 77080 DXA BONE DENSITY AXIAL: CPT | Performed by: RADIOLOGY

## 2024-05-13 ENCOUNTER — TELEPHONE (OUTPATIENT)
Dept: PRIMARY CARE | Facility: CLINIC | Age: 69
End: 2024-05-13
Payer: MEDICARE

## 2024-05-13 NOTE — TELEPHONE ENCOUNTER
----- Message from MICHAEL Carrillo-CNS sent at 5/10/2024  7:55 PM EDT -----  Please let patient know that her Mammogram is negative. Thank you!

## 2024-05-13 NOTE — TELEPHONE ENCOUNTER
----- Message from MICHAEL Carrillo-CNS sent at 5/10/2024  7:58 PM EDT -----  Please let patient know that Bone Density is normal. Thank you!

## 2024-06-01 ENCOUNTER — PHARMACY VISIT (OUTPATIENT)
Dept: PHARMACY | Facility: CLINIC | Age: 69
End: 2024-06-01
Payer: MEDICARE

## 2024-06-01 PROCEDURE — RXMED WILLOW AMBULATORY MEDICATION CHARGE

## 2024-06-10 ENCOUNTER — TELEPHONE (OUTPATIENT)
Dept: CARDIOLOGY | Facility: HOSPITAL | Age: 69
End: 2024-06-10
Payer: MEDICARE

## 2024-06-10 NOTE — TELEPHONE ENCOUNTER
Called pt for follow-up since last [facility] visit. Pt shares she has been struggling with getting protein into her diet due to the increase in her Mounjarou to 5mg and getting random nausea occasionally. She doesn't like protein shakes, bars, beans, lentils or fish. She takes her fasting blood sugar in the morning and they are around 90 and sometimes 1 hr post-prandial they are around 110-120s. She walks her dog almost 5 days per week for 20-45 minutes.     Recommendations:  1. Suggested putting greek yogurt or unflavored collagen protein in a homemade smoothie. Keep up with whole grains like whole grain breads, pasta or quinoa as they provide more protein than other foods.   2. Focus on small, frequent meals to avoid over-eating and nausea. Stressed avoidance of rich, fatty foods that might trigger nausea.    Handouts:  Plant Based Protein Ideas, Balanced Blueberry Smoothie, Spinach Wrap, and Soup Recipes

## 2024-07-01 PROCEDURE — RXMED WILLOW AMBULATORY MEDICATION CHARGE

## 2024-07-03 ENCOUNTER — PHARMACY VISIT (OUTPATIENT)
Dept: PHARMACY | Facility: CLINIC | Age: 69
End: 2024-07-03
Payer: MEDICARE

## 2024-07-18 ENCOUNTER — LAB (OUTPATIENT)
Dept: LAB | Facility: LAB | Age: 69
End: 2024-07-18
Payer: MEDICARE

## 2024-07-18 ENCOUNTER — OFFICE VISIT (OUTPATIENT)
Dept: PRIMARY CARE | Facility: CLINIC | Age: 69
End: 2024-07-18
Payer: MEDICARE

## 2024-07-18 VITALS
BODY MASS INDEX: 39.71 KG/M2 | DIASTOLIC BLOOD PRESSURE: 70 MMHG | HEART RATE: 89 BPM | HEIGHT: 67 IN | WEIGHT: 253 LBS | SYSTOLIC BLOOD PRESSURE: 130 MMHG

## 2024-07-18 DIAGNOSIS — I73.9 PVD (PERIPHERAL VASCULAR DISEASE) (CMS-HCC): ICD-10-CM

## 2024-07-18 DIAGNOSIS — E78.5 HYPERLIPIDEMIA ASSOCIATED WITH TYPE 2 DIABETES MELLITUS (MULTI): ICD-10-CM

## 2024-07-18 DIAGNOSIS — R42 DIZZINESS: ICD-10-CM

## 2024-07-18 DIAGNOSIS — E11.9 TYPE 2 DIABETES MELLITUS WITHOUT COMPLICATION, WITHOUT LONG-TERM CURRENT USE OF INSULIN (MULTI): ICD-10-CM

## 2024-07-18 DIAGNOSIS — R42 DIZZINESS: Primary | ICD-10-CM

## 2024-07-18 DIAGNOSIS — R51.9 NONINTRACTABLE HEADACHE, UNSPECIFIED CHRONICITY PATTERN, UNSPECIFIED HEADACHE TYPE: ICD-10-CM

## 2024-07-18 DIAGNOSIS — I10 BENIGN ESSENTIAL HYPERTENSION: ICD-10-CM

## 2024-07-18 DIAGNOSIS — E55.9 VITAMIN D DEFICIENCY: ICD-10-CM

## 2024-07-18 DIAGNOSIS — F41.9 ANXIETY: ICD-10-CM

## 2024-07-18 DIAGNOSIS — E03.9 HYPOTHYROIDISM IN ADULT: ICD-10-CM

## 2024-07-18 DIAGNOSIS — E11.69 HYPERLIPIDEMIA ASSOCIATED WITH TYPE 2 DIABETES MELLITUS (MULTI): ICD-10-CM

## 2024-07-18 DIAGNOSIS — E66.01 MORBID OBESITY (MULTI): ICD-10-CM

## 2024-07-18 DIAGNOSIS — F32.0 MILD MAJOR DEPRESSION (CMS-HCC): ICD-10-CM

## 2024-07-18 LAB
ALBUMIN SERPL BCP-MCNC: 3.7 G/DL (ref 3.4–5)
ALP SERPL-CCNC: 83 U/L (ref 33–136)
ALT SERPL W P-5'-P-CCNC: 16 U/L (ref 7–45)
ANION GAP SERPL CALC-SCNC: 12 MMOL/L (ref 10–20)
AST SERPL W P-5'-P-CCNC: 15 U/L (ref 9–39)
BILIRUB SERPL-MCNC: 0.5 MG/DL (ref 0–1.2)
BUN SERPL-MCNC: 8 MG/DL (ref 6–23)
CALCIUM SERPL-MCNC: 9.1 MG/DL (ref 8.6–10.3)
CHLORIDE SERPL-SCNC: 98 MMOL/L (ref 98–107)
CO2 SERPL-SCNC: 31 MMOL/L (ref 21–32)
CREAT SERPL-MCNC: 0.96 MG/DL (ref 0.5–1.05)
EGFRCR SERPLBLD CKD-EPI 2021: 64 ML/MIN/1.73M*2
ERYTHROCYTE [DISTWIDTH] IN BLOOD BY AUTOMATED COUNT: 15.3 % (ref 11.5–14.5)
GLUCOSE SERPL-MCNC: 74 MG/DL (ref 74–99)
HCT VFR BLD AUTO: 37.6 % (ref 36–46)
HGB BLD-MCNC: 11.9 G/DL (ref 12–16)
MAGNESIUM SERPL-MCNC: 1.54 MG/DL (ref 1.6–2.4)
MCH RBC QN AUTO: 28.1 PG (ref 26–34)
MCHC RBC AUTO-ENTMCNC: 31.6 G/DL (ref 32–36)
MCV RBC AUTO: 89 FL (ref 80–100)
NRBC BLD-RTO: 0 /100 WBCS (ref 0–0)
PLATELET # BLD AUTO: 309 X10*3/UL (ref 150–450)
POTASSIUM SERPL-SCNC: 3.8 MMOL/L (ref 3.5–5.3)
PROT SERPL-MCNC: 6.6 G/DL (ref 6.4–8.2)
RBC # BLD AUTO: 4.23 X10*6/UL (ref 4–5.2)
SODIUM SERPL-SCNC: 137 MMOL/L (ref 136–145)
T4 FREE SERPL-MCNC: 0.92 NG/DL (ref 0.61–1.12)
TSH SERPL-ACNC: 11.64 MIU/L (ref 0.44–3.98)
VIT B12 SERPL-MCNC: 288 PG/ML (ref 211–911)
WBC # BLD AUTO: 6.2 X10*3/UL (ref 4.4–11.3)

## 2024-07-18 PROCEDURE — 36415 COLL VENOUS BLD VENIPUNCTURE: CPT

## 2024-07-18 PROCEDURE — 3078F DIAST BP <80 MM HG: CPT | Performed by: CLINICAL NURSE SPECIALIST

## 2024-07-18 PROCEDURE — 1157F ADVNC CARE PLAN IN RCRD: CPT | Performed by: CLINICAL NURSE SPECIALIST

## 2024-07-18 PROCEDURE — 80053 COMPREHEN METABOLIC PANEL: CPT

## 2024-07-18 PROCEDURE — 3008F BODY MASS INDEX DOCD: CPT | Performed by: CLINICAL NURSE SPECIALIST

## 2024-07-18 PROCEDURE — 3061F NEG MICROALBUMINURIA REV: CPT | Performed by: CLINICAL NURSE SPECIALIST

## 2024-07-18 PROCEDURE — 1036F TOBACCO NON-USER: CPT | Performed by: CLINICAL NURSE SPECIALIST

## 2024-07-18 PROCEDURE — 99214 OFFICE O/P EST MOD 30 MIN: CPT | Performed by: CLINICAL NURSE SPECIALIST

## 2024-07-18 PROCEDURE — 82607 VITAMIN B-12: CPT

## 2024-07-18 PROCEDURE — 3048F LDL-C <100 MG/DL: CPT | Performed by: CLINICAL NURSE SPECIALIST

## 2024-07-18 PROCEDURE — 3075F SYST BP GE 130 - 139MM HG: CPT | Performed by: CLINICAL NURSE SPECIALIST

## 2024-07-18 PROCEDURE — 83735 ASSAY OF MAGNESIUM: CPT

## 2024-07-18 PROCEDURE — 3044F HG A1C LEVEL LT 7.0%: CPT | Performed by: CLINICAL NURSE SPECIALIST

## 2024-07-18 PROCEDURE — 84439 ASSAY OF FREE THYROXINE: CPT

## 2024-07-18 PROCEDURE — 4010F ACE/ARB THERAPY RXD/TAKEN: CPT | Performed by: CLINICAL NURSE SPECIALIST

## 2024-07-18 PROCEDURE — 85027 COMPLETE CBC AUTOMATED: CPT

## 2024-07-18 PROCEDURE — 84443 ASSAY THYROID STIM HORMONE: CPT

## 2024-07-18 PROCEDURE — 1159F MED LIST DOCD IN RCRD: CPT | Performed by: CLINICAL NURSE SPECIALIST

## 2024-07-18 RX ORDER — TIRZEPATIDE 5 MG/.5ML
5 INJECTION, SOLUTION SUBCUTANEOUS
Qty: 2 ML | Refills: 2 | Status: SHIPPED | OUTPATIENT
Start: 2024-07-21

## 2024-07-18 RX ORDER — LEVOTHYROXINE SODIUM 150 UG/1
150 TABLET ORAL DAILY
Qty: 90 TABLET | Refills: 3 | Status: SHIPPED | OUTPATIENT
Start: 2024-07-18 | End: 2025-07-18

## 2024-07-18 ASSESSMENT — ENCOUNTER SYMPTOMS
DIZZINESS: 1
ABDOMINAL PAIN: 0
FATIGUE: 0
WHEEZING: 0
MYALGIAS: 0
DIARRHEA: 0
POLYDIPSIA: 0
TROUBLE SWALLOWING: 0
DEPRESSION: 0
OCCASIONAL FEELINGS OF UNSTEADINESS: 0
CONFUSION: 0
CHILLS: 0
ARTHRALGIAS: 0
PALPITATIONS: 0
EYE PAIN: 0
JOINT SWELLING: 0
UNEXPECTED WEIGHT CHANGE: 0
WOUND: 0
DYSURIA: 0
SHORTNESS OF BREATH: 0
HEMATURIA: 0
NECK PAIN: 0
APPETITE CHANGE: 0
COUGH: 0
SLEEP DISTURBANCE: 0
PHOTOPHOBIA: 0
FEVER: 0
CHEST TIGHTNESS: 0
SORE THROAT: 0
BACK PAIN: 0
SEIZURES: 0
NAUSEA: 0
BRUISES/BLEEDS EASILY: 0
FLANK PAIN: 0
HEADACHES: 1
LOSS OF SENSATION IN FEET: 0
BLOOD IN STOOL: 0
ACTIVITY CHANGE: 0
CONSTIPATION: 0
VOMITING: 0

## 2024-07-18 NOTE — PROGRESS NOTES
Subjective   Patient ID: Elizabet Butler is a 68 y.o. female who presents for acute visit (Discuss off balance and dizziness/Comes and goes).  HPI    Patient presents in the office today with complaints of Dizziness. Patient states that she has had three episodes of Dizziness. Standing during episodes. Just gotten up from a seated position. Standing in a check out line. Denies any syncopal episode. Discussed with Cardiology and recommended to decrease the Carvedilol to see if there was any benefit. Denies any nausea with episodes. Feels that everything is spinning. History of Migraine variant. Headaches with symptoms.       Thyroid had been uncontrolled. Improved with restarting the oral medication.      The patient states she has been doing well with her Type II Diabetes control since the last visit. Insurance did not cover Mounjaro but was able to restart. Did not tolerate Ozempic. Was tolerating Trulicity better but benefited better from Mounjaro. Restarted Mounjaro and tolerating well.      Comorbid Illnesses: hypertension, hyperlipidemia and obesity.   Disease Course and Complications:. there have been no previous episodes of diabetic ketoacidosis. there have been no previous hospitalizations. She has no known diabetic complications. She has no significant interval events.      Following with [facility]. Following with Cardiology. Recent OV in May.      Has continued on Amitriptyline and Wellbutrin. Buspar PRN.     Review of Systems   Constitutional:  Negative for activity change, appetite change, chills, fatigue, fever and unexpected weight change.   HENT:  Negative for ear pain, hearing loss, nosebleeds, sore throat, tinnitus and trouble swallowing.    Eyes:  Negative for photophobia, pain and visual disturbance.   Respiratory:  Negative for cough, chest tightness, shortness of breath and wheezing.    Cardiovascular:  Negative for chest pain, palpitations and leg swelling.   Gastrointestinal:  Negative for abdominal  pain, blood in stool, constipation, diarrhea, nausea and vomiting.   Endocrine: Negative for cold intolerance, heat intolerance, polydipsia and polyuria.   Genitourinary:  Negative for dysuria, flank pain and hematuria.   Musculoskeletal:  Negative for arthralgias, back pain, joint swelling, myalgias and neck pain.   Skin:  Negative for pallor, rash and wound.   Allergic/Immunologic: Negative for immunocompromised state.   Neurological:  Positive for dizziness and headaches. Negative for seizures.   Hematological:  Does not bruise/bleed easily.   Psychiatric/Behavioral:  Negative for confusion and sleep disturbance.        Objective   Physical Exam  Vitals and nursing note reviewed.   Constitutional:       General: She is not in acute distress.     Appearance: Normal appearance.   HENT:      Head: Normocephalic.      Nose: Nose normal.   Eyes:      Conjunctiva/sclera: Conjunctivae normal.   Neck:      Vascular: No carotid bruit.   Cardiovascular:      Rate and Rhythm: Normal rate and regular rhythm.      Pulses: Normal pulses.      Heart sounds: Normal heart sounds.   Pulmonary:      Effort: Pulmonary effort is normal.      Breath sounds: Normal breath sounds.   Abdominal:      General: Bowel sounds are normal.      Palpations: Abdomen is soft.   Musculoskeletal:         General: Normal range of motion.      Cervical back: Normal range of motion.   Skin:     General: Skin is warm and dry.   Neurological:      Mental Status: She is alert and oriented to person, place, and time. Mental status is at baseline.   Psychiatric:         Mood and Affect: Mood normal.         Behavior: Behavior normal.       Assessment/Plan         Reviewed most recent lab work available. Updated labs ordered.      Hypothyroidism. Continue Levothyroxine dosage. Reevaluate with next lab work. Updated lab work ordered.   Abnormal Calcium score. She had normal stress test in the summer of 2017. She has been seen by cardiology and is now on a  statin, baby aspirin a beta blocker. Yearly follow-up with cardiology.   PVD: Stable at this time. continue to monitor.   Hypertension. Blood pressures normally well controlled. Will continue current medication. Patient previously discontinued ARB secondary to dizziness. Now lowered Carvedilol due to dizziness.   Insomnia, Anxiety, Depression, History of migraine variant. Doing well continue carvedilol and amitriptyline. Increased Amitriptyline with increased Depression. Continue Wellbutrin, discussed adjusting dosage as she is not sure that she still needs. Buspirone. Reevaluate at follow up appointment.   Diabetes mellitus type 2: A1C 6.3%. Did not tolerate Ozempic. Initially continue Metformin, monitor blood sugars to discontinue. Continue to monitor hemoglobin A1c. Urine Albumin: 2024. ARB discontinued by patient as above secondary to dizziness. No diabetic complications. Diabetic eye exam , patient reminded to schedule this years exam. Restarted Mounjaro.   Mild dyslipidemia. Continue atorvastatin 40 mg daily. Repeat lipid panel yearly is ordered per cardiology.  Obesity: BMI: 39.63. Lifestyle changes as noted above.  Vitamin B12 Deficiency: B12 injection today and then will start Vitamin B12 OTC. Reevaluate with next lab work.   Dizziness: Encouraged to continue to follow back with Cardiology with symptoms as well. Orthostatics in the office, negative. Lyin/68. Sittin/68. Standin/68. Additional lab work ordered. Imaging ordered.      No further Pap smears required secondary to age.   DEXA scan May 2024, normal.   Normal mammogram May 2024.   Colonoscopy: 2023, plan to repeat in 5 years.   Prevnar 20: 2023.  Pneumovax: 2019.   Flu Vaccine: 2023.   Shingrix: 2020, 2021.   Medicare Wellness: May 2024.   Discussed Tdap.     Nelly Lucas, MICHAEL-CNS 24 7:26 AM Patient was identified as a fall risk. Risk prevention instructions  provided.

## 2024-07-25 PROBLEM — Z86.39 HISTORY OF HYPOTHYROIDISM: Status: ACTIVE | Noted: 2024-07-25

## 2024-07-25 PROBLEM — E78.5 DYSLIPIDEMIA DUE TO TYPE 2 DIABETES MELLITUS (MULTI): Status: ACTIVE | Noted: 2023-03-04

## 2024-07-25 PROBLEM — U07.1 DISEASE DUE TO SEVERE ACUTE RESPIRATORY SYNDROME CORONAVIRUS 2 (SARS-COV-2): Status: ACTIVE | Noted: 2024-07-25

## 2024-07-25 PROBLEM — M53.3 SACROILIAC JOINT PAIN: Status: ACTIVE | Noted: 2024-07-25

## 2024-07-25 PROBLEM — Z86.69 HISTORY OF MIGRAINE: Status: ACTIVE | Noted: 2024-07-25

## 2024-07-25 PROBLEM — E11.69 DYSLIPIDEMIA DUE TO TYPE 2 DIABETES MELLITUS (MULTI): Status: ACTIVE | Noted: 2023-03-04

## 2024-07-25 RX ORDER — ONDANSETRON 4 MG/1
4 TABLET, FILM COATED ORAL EVERY 8 HOURS PRN
COMMUNITY
Start: 2024-07-01

## 2024-07-31 ENCOUNTER — HOSPITAL ENCOUNTER (OUTPATIENT)
Dept: RADIOLOGY | Facility: HOSPITAL | Age: 69
Discharge: HOME | End: 2024-07-31
Payer: MEDICARE

## 2024-07-31 DIAGNOSIS — R42 DIZZINESS: ICD-10-CM

## 2024-07-31 DIAGNOSIS — R51.9 NONINTRACTABLE HEADACHE, UNSPECIFIED CHRONICITY PATTERN, UNSPECIFIED HEADACHE TYPE: ICD-10-CM

## 2024-07-31 PROCEDURE — 70450 CT HEAD/BRAIN W/O DYE: CPT | Performed by: RADIOLOGY

## 2024-07-31 PROCEDURE — 70450 CT HEAD/BRAIN W/O DYE: CPT

## 2024-08-05 PROCEDURE — RXMED WILLOW AMBULATORY MEDICATION CHARGE

## 2024-08-06 ENCOUNTER — PHARMACY VISIT (OUTPATIENT)
Dept: PHARMACY | Facility: CLINIC | Age: 69
End: 2024-08-06
Payer: MEDICARE

## 2024-08-12 ENCOUNTER — PATIENT MESSAGE (OUTPATIENT)
Dept: PRIMARY CARE | Facility: CLINIC | Age: 69
End: 2024-08-12
Payer: MEDICARE

## 2024-08-16 ENCOUNTER — APPOINTMENT (OUTPATIENT)
Dept: CARDIOLOGY | Facility: CLINIC | Age: 69
End: 2024-08-16
Payer: MEDICARE

## 2024-08-16 DIAGNOSIS — R55 POSTURAL DIZZINESS WITH PRESYNCOPE: Primary | ICD-10-CM

## 2024-08-16 DIAGNOSIS — I83.90 ASYMPTOMATIC VARICOSE VEINS: ICD-10-CM

## 2024-08-16 DIAGNOSIS — R93.1 ELEVATED CORONARY ARTERY CALCIUM SCORE: ICD-10-CM

## 2024-08-16 DIAGNOSIS — R42 DIZZINESS: ICD-10-CM

## 2024-08-16 DIAGNOSIS — I10 BENIGN ESSENTIAL HYPERTENSION: ICD-10-CM

## 2024-08-16 DIAGNOSIS — E11.69 HYPERLIPIDEMIA ASSOCIATED WITH TYPE 2 DIABETES MELLITUS (MULTI): ICD-10-CM

## 2024-08-16 DIAGNOSIS — R42 POSTURAL DIZZINESS WITH PRESYNCOPE: Primary | ICD-10-CM

## 2024-08-16 DIAGNOSIS — E78.5 HYPERLIPIDEMIA ASSOCIATED WITH TYPE 2 DIABETES MELLITUS (MULTI): ICD-10-CM

## 2024-08-16 NOTE — PATIENT INSTRUCTIONS
Thank you for following up with us in office today.    1) I've ordered two tests for you:  -an echocardiogram - you will be contacted to schedule this  -a 30 day event monitor - this will be mailed to you    2)  I am referring you to vascular medicine due to worsening varicose veins    3)  Please continue the remainder of your medications as prescribed    If you have any questions, please contact the office and leave a message for Dr. Pang's nurse, Nadine Mathur.  [phone]  Please follow up with  [facility] in 6 months  Please follow up with Dr. Pang in one year

## 2024-08-16 NOTE — PROGRESS NOTES
"Chief Complaint:   No chief complaint on file.     History Of Present Illness:    Elizabet Butler is a 68 y.o. female presenting for virtual visit.  She has a history of elevated CAC score (total score 1136.4), HTN, Orthostatic hypotension, Palpitations, and DM2  Due to elevated CAC score, she did have a stress echo in 4/2024 - EKG was indeterminate due to baseline abnl, but there was no clinical or echocardiographic evidence of ischemia.    Last visit we reviewed a holter monitor that was ordered for dizziness, palps.  This demonstrated one episode of pSVT.  Due to complaint of dizziness, I advised her to take half of her carvedilol twice daily, and recommended follow up.  I also increased her atorvastatin to 60mg last visit.      Today Elizabet is telling me that she has no chest pain.  She felt that her dizziness improved with the lower dose of carvedilol, but she still has episodes of dizziness - twice a month she has had \"intense\" dizzy episodes when she almost passed out - these did not occur with change in position, occurred while upright walking to her kitchen.      BP today is 113/63  HR 80  Her home log indicates:  110/68  HR 76  123/68  HR 90  110/96  HR 87    Last Recorded Vitals:  There were no vitals filed for this visit.    Past Medical History:  She has a past medical history of Anxiety disorder, unspecified, Body mass index (BMI) 34.0-34.9, adult (10/16/2019), Body mass index (BMI) 38.0-38.9, adult (09/21/2021), Body mass index (BMI) 39.0-39.9, adult (01/13/2021), Cellulitis of unspecified part of limb (01/13/2021), COVID-19 (01/13/2021), DM2 (diabetes mellitus, type 2) (Multi), Hyperlipidemia, Hypertension, Major depressive disorder, single episode, in full remission (CMS-HCC) (01/13/2021), Major depressive disorder, single episode, moderate (Multi) (07/22/2022), Personal history of other diseases of the digestive system (08/18/2020), Personal history of other diseases of the musculoskeletal system and " connective tissue, Personal history of other diseases of the nervous system and sense organs, Personal history of other endocrine, nutritional and metabolic disease, and Personal history of other specified conditions.    Past Surgical History:  She has a past surgical history that includes  section, classic (2017) and Cholecystectomy (2017).      Social History:  She reports that she has never smoked. She has never used smokeless tobacco. She reports that she does not drink alcohol and does not use drugs.    Family History:  Family History   Problem Relation Name Age of Onset   • Cancer Father     • Hypertension Father     • Heart disease Father          Allergies:  Patient has no known allergies.    Outpatient Medications:  Current Outpatient Medications   Medication Instructions   • amitriptyline (ELAVIL) 30 mg, oral, Nightly   • aspirin 81 mg chewable tablet 1 tablet, oral, Daily   • atorvastatin (LIPITOR) 60 mg, oral, Nightly   • blood sugar diagnostic strip 1 strip, miscellaneous, Daily   • blood-glucose meter misc Check blood sugars once daily   • buPROPion XL (WELLBUTRIN XL) 300 mg, oral, Daily   • busPIRone (BUSPAR) 7.5 mg, oral, As needed   • carvedilol (COREG) 12.5 mg, oral, 2 times daily (morning and late afternoon)   • cholecalciferol (VITAMIN D3) 5,000 Units, oral, Daily   • hydroCHLOROthiazide (MICROZIDE) 12.5 mg, oral, Daily   • lancets misc Check blood sugars daily   • levothyroxine (SYNTHROID, LEVOXYL) 150 mcg, oral, Daily   • losartan (COZAAR) 100 mg, oral, Daily   • metFORMIN XR (GLUCOPHAGE-XR) 750 mg, oral, Daily, as directed   • Mounjaro 5 mg, subcutaneous, Once Weekly   • omeprazole (PRILOSEC) 40 mg, oral, Daily   • ondansetron (ZOFRAN) 4 mg, oral, Every 8 hours PRN   • OneTouch Ultra Test strip USE 1 STRIP TO CHECK GLUCOSE TWICE DAILY   • semaglutide (OZEMPIC) 0.25 mg, subcutaneous, Every 7 days       Physical Exam:  Physical Exam  HENT:      Head: Normocephalic.       Mouth/Throat:      Mouth: Mucous membranes are dry.   Eyes:      Extraocular Movements: Extraocular movements intact.   Pulmonary:      Effort: Pulmonary effort is normal.   Musculoskeletal:      Cervical back: Neck supple.   Neurological:      Mental Status: She is alert and oriented to person, place, and time.   Psychiatric:         Mood and Affect: Mood normal.        Last Labs:  CBC -  Lab Results   Component Value Date    WBC 6.2 07/18/2024    HGB 11.9 (L) 07/18/2024    HCT 37.6 07/18/2024    MCV 89 07/18/2024     07/18/2024       CMP -  Lab Results   Component Value Date    CALCIUM 9.1 07/18/2024    PROT 6.6 07/18/2024    ALBUMIN 3.7 07/18/2024    AST 15 07/18/2024    ALT 16 07/18/2024    ALKPHOS 83 07/18/2024    BILITOT 0.5 07/18/2024       LIPID PANEL -   Lab Results   Component Value Date    CHOL 140 04/26/2024    TRIG 87 04/26/2024    HDL 63.1 04/26/2024    CHHDL 2.2 04/26/2024    LDLF 62 06/30/2023    VLDL 17 04/26/2024    NHDL 77 04/26/2024       RENAL FUNCTION PANEL -   Lab Results   Component Value Date    GLUCOSE 74 07/18/2024     07/18/2024    K 3.8 07/18/2024    CL 98 07/18/2024    CO2 31 07/18/2024    ANIONGAP 12 07/18/2024    BUN 8 07/18/2024    CREATININE 0.96 07/18/2024    CALCIUM 9.1 07/18/2024    ALBUMIN 3.7 07/18/2024        Lab Results   Component Value Date    HGBA1C 6.3 (H) 04/26/2024       Assessment/Plan   Elevated CAC score > 1000  Presyncope  HTN  Varicose veins    No angina.  Still having dizzy spells, with particularly severe episodes ~ 2x per month, feels like she may pass out for a few seconds, resolves on its own.    Discussed with pt that prior holter may not have been long enough to catch one of the above episodes.  She also is reporting worsening varicose veins and occasional leg swelling.    Plan:  -check TTE  -check 30 day event monitor  -continue ASA 81mg daily  -atorvastatin to 60mg daily  -stay on lower dose carvedilol 12.5mg, half tab  bid  -hydrochlorothiazide 12.5mg daily  -advised to stay hydrated while on diuretic  -losartan 100mg daily  -referral to [facility] medicine  -see me for in person visit, to bring her home BP monitor and log    Elaina Pang MD

## 2024-08-22 ENCOUNTER — LAB (OUTPATIENT)
Dept: LAB | Facility: LAB | Age: 69
End: 2024-08-22
Payer: MEDICARE

## 2024-08-22 DIAGNOSIS — E11.69 HYPERLIPIDEMIA ASSOCIATED WITH TYPE 2 DIABETES MELLITUS (MULTI): ICD-10-CM

## 2024-08-22 DIAGNOSIS — E66.01 MORBID OBESITY (MULTI): ICD-10-CM

## 2024-08-22 DIAGNOSIS — I73.9 PVD (PERIPHERAL VASCULAR DISEASE) (CMS-HCC): ICD-10-CM

## 2024-08-22 DIAGNOSIS — I10 BENIGN ESSENTIAL HYPERTENSION: ICD-10-CM

## 2024-08-22 DIAGNOSIS — F32.0 MILD MAJOR DEPRESSION (CMS-HCC): ICD-10-CM

## 2024-08-22 DIAGNOSIS — E78.5 HYPERLIPIDEMIA ASSOCIATED WITH TYPE 2 DIABETES MELLITUS (MULTI): ICD-10-CM

## 2024-08-22 DIAGNOSIS — E11.9 TYPE 2 DIABETES MELLITUS WITHOUT COMPLICATION, WITHOUT LONG-TERM CURRENT USE OF INSULIN (MULTI): ICD-10-CM

## 2024-08-22 DIAGNOSIS — E03.9 HYPOTHYROIDISM IN ADULT: ICD-10-CM

## 2024-08-22 DIAGNOSIS — F41.9 ANXIETY: ICD-10-CM

## 2024-08-22 DIAGNOSIS — E55.9 VITAMIN D DEFICIENCY: ICD-10-CM

## 2024-08-22 LAB
MAGNESIUM SERPL-MCNC: 1.78 MG/DL (ref 1.6–2.4)
TSH SERPL-ACNC: 0.68 MIU/L (ref 0.44–3.98)
VIT B12 SERPL-MCNC: 2191 PG/ML (ref 211–911)

## 2024-08-22 PROCEDURE — 83735 ASSAY OF MAGNESIUM: CPT

## 2024-08-22 PROCEDURE — 82607 VITAMIN B-12: CPT

## 2024-08-22 PROCEDURE — 84443 ASSAY THYROID STIM HORMONE: CPT

## 2024-08-22 PROCEDURE — 36415 COLL VENOUS BLD VENIPUNCTURE: CPT

## 2024-08-26 ENCOUNTER — PATIENT MESSAGE (OUTPATIENT)
Dept: PRIMARY CARE | Facility: CLINIC | Age: 69
End: 2024-08-26
Payer: MEDICARE

## 2024-08-26 DIAGNOSIS — E11.9 TYPE 2 DIABETES MELLITUS WITHOUT COMPLICATION, WITHOUT LONG-TERM CURRENT USE OF INSULIN (MULTI): ICD-10-CM

## 2024-08-26 RX ORDER — METFORMIN HYDROCHLORIDE 750 MG/1
750 TABLET, EXTENDED RELEASE ORAL DAILY
Qty: 90 TABLET | Refills: 3 | Status: SHIPPED | OUTPATIENT
Start: 2024-08-26 | End: 2025-08-21

## 2024-09-03 ENCOUNTER — HOSPITAL ENCOUNTER (OUTPATIENT)
Dept: CARDIOLOGY | Facility: HOSPITAL | Age: 69
Discharge: HOME | End: 2024-09-03
Payer: MEDICARE

## 2024-09-03 DIAGNOSIS — R55 POSTURAL DIZZINESS WITH PRESYNCOPE: ICD-10-CM

## 2024-09-03 DIAGNOSIS — R42 DIZZINESS: ICD-10-CM

## 2024-09-03 DIAGNOSIS — R42 POSTURAL DIZZINESS WITH PRESYNCOPE: ICD-10-CM

## 2024-09-03 PROCEDURE — 2500000004 HC RX 250 GENERAL PHARMACY W/ HCPCS (ALT 636 FOR OP/ED): Performed by: INTERNAL MEDICINE

## 2024-09-03 PROCEDURE — 93306 TTE W/DOPPLER COMPLETE: CPT | Performed by: INTERNAL MEDICINE

## 2024-09-03 PROCEDURE — 93306 TTE W/DOPPLER COMPLETE: CPT

## 2024-09-04 LAB
AORTIC VALVE MEAN GRADIENT: 6 MMHG
AORTIC VALVE PEAK VELOCITY: 1.48 M/S
AV PEAK GRADIENT: 8.8 MMHG
AVA (PEAK VEL): 2.7 CM2
AVA (VTI): 2.72 CM2
EJECTION FRACTION APICAL 4 CHAMBER: 68.7
EJECTION FRACTION: 60 %
LEFT ATRIUM VOLUME AREA LENGTH INDEX BSA: 24.9 ML/M2
LEFT VENTRICLE INTERNAL DIMENSION DIASTOLE: 4.3 CM (ref 3.5–6)
LEFT VENTRICULAR OUTFLOW TRACT DIAMETER: 2 CM
LV EJECTION FRACTION BIPLANE: 60 %
MITRAL VALVE E/A RATIO: 0.75
MITRAL VALVE E/E' RATIO: 10.9
TRICUSPID ANNULAR PLANE SYSTOLIC EXCURSION: 2.5 CM

## 2024-09-06 ENCOUNTER — ANCILLARY PROCEDURE (OUTPATIENT)
Dept: CARDIOLOGY | Facility: HOSPITAL | Age: 69
End: 2024-09-06
Payer: MEDICARE

## 2024-09-06 DIAGNOSIS — R42 POSTURAL DIZZINESS WITH PRESYNCOPE: Primary | ICD-10-CM

## 2024-09-06 DIAGNOSIS — R42 DIZZINESS: ICD-10-CM

## 2024-09-06 DIAGNOSIS — R55 POSTURAL DIZZINESS WITH PRESYNCOPE: Primary | ICD-10-CM

## 2024-09-14 PROCEDURE — RXMED WILLOW AMBULATORY MEDICATION CHARGE

## 2024-09-18 ENCOUNTER — PHARMACY VISIT (OUTPATIENT)
Dept: PHARMACY | Facility: CLINIC | Age: 69
End: 2024-09-18
Payer: MEDICARE

## 2024-10-01 ENCOUNTER — APPOINTMENT (OUTPATIENT)
Dept: CARDIOLOGY | Facility: CLINIC | Age: 69
End: 2024-10-01
Payer: MEDICARE

## 2024-10-06 ENCOUNTER — PATIENT MESSAGE (OUTPATIENT)
Dept: PRIMARY CARE | Facility: CLINIC | Age: 69
End: 2024-10-06
Payer: MEDICARE

## 2024-10-06 DIAGNOSIS — E11.9 TYPE 2 DIABETES MELLITUS WITHOUT COMPLICATION, WITHOUT LONG-TERM CURRENT USE OF INSULIN (MULTI): Primary | ICD-10-CM

## 2024-10-06 DIAGNOSIS — F32.A DEPRESSION, UNSPECIFIED DEPRESSION TYPE: ICD-10-CM

## 2024-10-07 RX ORDER — AMITRIPTYLINE HYDROCHLORIDE 10 MG/1
30 TABLET, FILM COATED ORAL NIGHTLY
Qty: 270 TABLET | Refills: 1 | Status: SHIPPED | OUTPATIENT
Start: 2024-10-07 | End: 2025-04-05

## 2024-10-07 RX ORDER — TIRZEPATIDE 7.5 MG/.5ML
7.5 INJECTION, SOLUTION SUBCUTANEOUS WEEKLY
Qty: 2 ML | Refills: 2 | Status: SHIPPED | OUTPATIENT
Start: 2024-10-07 | End: 2024-10-07 | Stop reason: SDUPTHER

## 2024-10-07 RX ORDER — TIRZEPATIDE 7.5 MG/.5ML
7.5 INJECTION, SOLUTION SUBCUTANEOUS WEEKLY
Qty: 2 ML | Refills: 2 | Status: SHIPPED | OUTPATIENT
Start: 2024-10-07

## 2024-10-08 PROCEDURE — RXMED WILLOW AMBULATORY MEDICATION CHARGE

## 2024-10-10 ENCOUNTER — PHARMACY VISIT (OUTPATIENT)
Dept: PHARMACY | Facility: CLINIC | Age: 69
End: 2024-10-10
Payer: MEDICARE

## 2024-10-14 ENCOUNTER — APPOINTMENT (OUTPATIENT)
Dept: CARDIOLOGY | Facility: CLINIC | Age: 69
End: 2024-10-14
Payer: MEDICARE

## 2024-10-17 ENCOUNTER — TELEPHONE (OUTPATIENT)
Dept: CARDIOLOGY | Facility: HOSPITAL | Age: 69
End: 2024-10-17
Payer: MEDICARE

## 2024-10-17 NOTE — TELEPHONE ENCOUNTER
Onset of discharge today, headache 30 minutes ago    Abdominal pain onset today, denies N/V/D Patient did call me back and I reviewed results with her.

## 2024-10-17 NOTE — TELEPHONE ENCOUNTER
Called patient had to leave a message to return my call to review the results of her monitor.      She had monitor placed due to c/o intense dizziness about twice a month almost passed out.    She wore monitor from 9/6-10/5/2024.  Predominant rhythm was sinus max heart rate 120 bpm, min heart rate 65  av was 83, VE, SVE beats with a burden of <1%.  Three event transmissions with symptoms of light headedness , none, baseline lightheadedness rhythms we ST, SR and ST with artifact.

## 2024-11-04 ENCOUNTER — LAB (OUTPATIENT)
Dept: LAB | Facility: LAB | Age: 69
End: 2024-11-04
Payer: MEDICARE

## 2024-11-04 ENCOUNTER — PHARMACY VISIT (OUTPATIENT)
Dept: PHARMACY | Facility: CLINIC | Age: 69
End: 2024-11-04
Payer: MEDICARE

## 2024-11-04 DIAGNOSIS — E66.01 MORBID OBESITY (MULTI): ICD-10-CM

## 2024-11-04 DIAGNOSIS — I73.9 PVD (PERIPHERAL VASCULAR DISEASE) (CMS-HCC): ICD-10-CM

## 2024-11-04 DIAGNOSIS — Z00.00 MEDICARE ANNUAL WELLNESS VISIT, SUBSEQUENT: ICD-10-CM

## 2024-11-04 DIAGNOSIS — I10 BENIGN ESSENTIAL HYPERTENSION: ICD-10-CM

## 2024-11-04 DIAGNOSIS — E11.69 HYPERLIPIDEMIA ASSOCIATED WITH TYPE 2 DIABETES MELLITUS (MULTI): ICD-10-CM

## 2024-11-04 DIAGNOSIS — E78.5 HYPERLIPIDEMIA ASSOCIATED WITH TYPE 2 DIABETES MELLITUS (MULTI): ICD-10-CM

## 2024-11-04 DIAGNOSIS — Z78.0 POST-MENOPAUSAL: ICD-10-CM

## 2024-11-04 DIAGNOSIS — E03.9 HYPOTHYROIDISM IN ADULT: ICD-10-CM

## 2024-11-04 DIAGNOSIS — F32.0 MILD MAJOR DEPRESSION (CMS-HCC): ICD-10-CM

## 2024-11-04 DIAGNOSIS — E11.9 TYPE 2 DIABETES MELLITUS WITHOUT COMPLICATION, WITHOUT LONG-TERM CURRENT USE OF INSULIN (MULTI): ICD-10-CM

## 2024-11-04 DIAGNOSIS — Z12.31 VISIT FOR SCREENING MAMMOGRAM: ICD-10-CM

## 2024-11-04 DIAGNOSIS — R11.0 NAUSEA: ICD-10-CM

## 2024-11-04 DIAGNOSIS — E53.8 VITAMIN B12 DEFICIENCY: ICD-10-CM

## 2024-11-04 LAB
25(OH)D3 SERPL-MCNC: 82 NG/ML (ref 30–100)
ALBUMIN SERPL BCP-MCNC: 3.8 G/DL (ref 3.4–5)
ALP SERPL-CCNC: 82 U/L (ref 33–136)
ALT SERPL W P-5'-P-CCNC: 16 U/L (ref 7–45)
ANION GAP SERPL CALC-SCNC: 12 MMOL/L (ref 10–20)
AST SERPL W P-5'-P-CCNC: 14 U/L (ref 9–39)
BILIRUB SERPL-MCNC: 0.5 MG/DL (ref 0–1.2)
BUN SERPL-MCNC: 9 MG/DL (ref 6–23)
CALCIUM SERPL-MCNC: 9 MG/DL (ref 8.6–10.3)
CHLORIDE SERPL-SCNC: 100 MMOL/L (ref 98–107)
CHOLEST SERPL-MCNC: 136 MG/DL (ref 0–199)
CHOLESTEROL/HDL RATIO: 2.2
CO2 SERPL-SCNC: 30 MMOL/L (ref 21–32)
CREAT SERPL-MCNC: 0.96 MG/DL (ref 0.5–1.05)
EGFRCR SERPLBLD CKD-EPI 2021: 64 ML/MIN/1.73M*2
ERYTHROCYTE [DISTWIDTH] IN BLOOD BY AUTOMATED COUNT: 13.9 % (ref 11.5–14.5)
GLUCOSE SERPL-MCNC: 82 MG/DL (ref 74–99)
HCT VFR BLD AUTO: 37.3 % (ref 36–46)
HDLC SERPL-MCNC: 61.6 MG/DL
HGB BLD-MCNC: 11.9 G/DL (ref 12–16)
LDLC SERPL CALC-MCNC: 55 MG/DL
MCH RBC QN AUTO: 29.2 PG (ref 26–34)
MCHC RBC AUTO-ENTMCNC: 31.9 G/DL (ref 32–36)
MCV RBC AUTO: 91 FL (ref 80–100)
NON HDL CHOLESTEROL: 74 MG/DL (ref 0–149)
NRBC BLD-RTO: 0 /100 WBCS (ref 0–0)
PLATELET # BLD AUTO: 338 X10*3/UL (ref 150–450)
POTASSIUM SERPL-SCNC: 3.7 MMOL/L (ref 3.5–5.3)
PROT SERPL-MCNC: 6.6 G/DL (ref 6.4–8.2)
RBC # BLD AUTO: 4.08 X10*6/UL (ref 4–5.2)
SODIUM SERPL-SCNC: 138 MMOL/L (ref 136–145)
T4 FREE SERPL-MCNC: 1.55 NG/DL (ref 0.61–1.12)
TRIGL SERPL-MCNC: 99 MG/DL (ref 0–149)
TSH SERPL-ACNC: 0.12 MIU/L (ref 0.44–3.98)
VIT B12 SERPL-MCNC: 2390 PG/ML (ref 211–911)
VLDL: 20 MG/DL (ref 0–40)
WBC # BLD AUTO: 5.5 X10*3/UL (ref 4.4–11.3)

## 2024-11-04 PROCEDURE — 82306 VITAMIN D 25 HYDROXY: CPT

## 2024-11-04 PROCEDURE — 36415 COLL VENOUS BLD VENIPUNCTURE: CPT

## 2024-11-04 PROCEDURE — RXMED WILLOW AMBULATORY MEDICATION CHARGE

## 2024-11-04 PROCEDURE — 82607 VITAMIN B-12: CPT

## 2024-11-04 PROCEDURE — 83036 HEMOGLOBIN GLYCOSYLATED A1C: CPT

## 2024-11-04 PROCEDURE — 86803 HEPATITIS C AB TEST: CPT

## 2024-11-04 PROCEDURE — 84439 ASSAY OF FREE THYROXINE: CPT

## 2024-11-04 PROCEDURE — 80053 COMPREHEN METABOLIC PANEL: CPT

## 2024-11-04 PROCEDURE — 80061 LIPID PANEL: CPT

## 2024-11-04 PROCEDURE — 84443 ASSAY THYROID STIM HORMONE: CPT

## 2024-11-04 PROCEDURE — 85027 COMPLETE CBC AUTOMATED: CPT

## 2024-11-05 LAB
EST. AVERAGE GLUCOSE BLD GHB EST-MCNC: 120 MG/DL
HBA1C MFR BLD: 5.8 %
HCV AB SER QL: NONREACTIVE

## 2024-11-07 ENCOUNTER — APPOINTMENT (OUTPATIENT)
Dept: PRIMARY CARE | Facility: CLINIC | Age: 69
End: 2024-11-07
Payer: MEDICARE

## 2024-11-07 VITALS
OXYGEN SATURATION: 96 % | BODY MASS INDEX: 37.75 KG/M2 | SYSTOLIC BLOOD PRESSURE: 118 MMHG | DIASTOLIC BLOOD PRESSURE: 78 MMHG | WEIGHT: 241 LBS

## 2024-11-07 DIAGNOSIS — E03.9 HYPOTHYROIDISM IN ADULT: ICD-10-CM

## 2024-11-07 DIAGNOSIS — I73.9 PVD (PERIPHERAL VASCULAR DISEASE) (CMS-HCC): ICD-10-CM

## 2024-11-07 DIAGNOSIS — E11.9 TYPE 2 DIABETES MELLITUS WITHOUT COMPLICATION, WITHOUT LONG-TERM CURRENT USE OF INSULIN (MULTI): ICD-10-CM

## 2024-11-07 DIAGNOSIS — E78.5 HYPERLIPIDEMIA ASSOCIATED WITH TYPE 2 DIABETES MELLITUS (MULTI): ICD-10-CM

## 2024-11-07 DIAGNOSIS — F32.0 MILD MAJOR DEPRESSION (CMS-HCC): ICD-10-CM

## 2024-11-07 DIAGNOSIS — R11.0 NAUSEA: ICD-10-CM

## 2024-11-07 DIAGNOSIS — Z86.39 HISTORY OF HYPOTHYROIDISM: Primary | ICD-10-CM

## 2024-11-07 DIAGNOSIS — K21.9 GASTROESOPHAGEAL REFLUX DISEASE, UNSPECIFIED WHETHER ESOPHAGITIS PRESENT: ICD-10-CM

## 2024-11-07 DIAGNOSIS — E11.69 HYPERLIPIDEMIA ASSOCIATED WITH TYPE 2 DIABETES MELLITUS (MULTI): ICD-10-CM

## 2024-11-07 DIAGNOSIS — I10 BENIGN ESSENTIAL HYPERTENSION: ICD-10-CM

## 2024-11-07 DIAGNOSIS — E66.01 MORBID OBESITY (MULTI): ICD-10-CM

## 2024-11-07 DIAGNOSIS — F41.9 ANXIETY: ICD-10-CM

## 2024-11-07 DIAGNOSIS — E53.8 VITAMIN B12 DEFICIENCY: ICD-10-CM

## 2024-11-07 PROCEDURE — 90662 IIV NO PRSV INCREASED AG IM: CPT | Performed by: CLINICAL NURSE SPECIALIST

## 2024-11-07 PROCEDURE — 1157F ADVNC CARE PLAN IN RCRD: CPT | Performed by: CLINICAL NURSE SPECIALIST

## 2024-11-07 PROCEDURE — 3074F SYST BP LT 130 MM HG: CPT | Performed by: CLINICAL NURSE SPECIALIST

## 2024-11-07 PROCEDURE — G0008 ADMIN INFLUENZA VIRUS VAC: HCPCS | Performed by: CLINICAL NURSE SPECIALIST

## 2024-11-07 PROCEDURE — 1158F ADVNC CARE PLAN TLK DOCD: CPT | Performed by: CLINICAL NURSE SPECIALIST

## 2024-11-07 PROCEDURE — 1160F RVW MEDS BY RX/DR IN RCRD: CPT | Performed by: CLINICAL NURSE SPECIALIST

## 2024-11-07 PROCEDURE — 1159F MED LIST DOCD IN RCRD: CPT | Performed by: CLINICAL NURSE SPECIALIST

## 2024-11-07 PROCEDURE — 3044F HG A1C LEVEL LT 7.0%: CPT | Performed by: CLINICAL NURSE SPECIALIST

## 2024-11-07 PROCEDURE — 3061F NEG MICROALBUMINURIA REV: CPT | Performed by: CLINICAL NURSE SPECIALIST

## 2024-11-07 PROCEDURE — 3078F DIAST BP <80 MM HG: CPT | Performed by: CLINICAL NURSE SPECIALIST

## 2024-11-07 PROCEDURE — 99214 OFFICE O/P EST MOD 30 MIN: CPT | Performed by: CLINICAL NURSE SPECIALIST

## 2024-11-07 PROCEDURE — 1123F ACP DISCUSS/DSCN MKR DOCD: CPT | Performed by: CLINICAL NURSE SPECIALIST

## 2024-11-07 PROCEDURE — 1036F TOBACCO NON-USER: CPT | Performed by: CLINICAL NURSE SPECIALIST

## 2024-11-07 PROCEDURE — 4010F ACE/ARB THERAPY RXD/TAKEN: CPT | Performed by: CLINICAL NURSE SPECIALIST

## 2024-11-07 PROCEDURE — 3048F LDL-C <100 MG/DL: CPT | Performed by: CLINICAL NURSE SPECIALIST

## 2024-11-07 RX ORDER — METFORMIN HYDROCHLORIDE 750 MG/1
750 TABLET, EXTENDED RELEASE ORAL DAILY
Qty: 90 TABLET | Refills: 3 | Status: SHIPPED | OUTPATIENT
Start: 2024-11-07 | End: 2025-11-02

## 2024-11-07 RX ORDER — OMEPRAZOLE 40 MG/1
40 CAPSULE, DELAYED RELEASE ORAL DAILY
Qty: 90 CAPSULE | Refills: 1 | Status: SHIPPED | OUTPATIENT
Start: 2024-11-07 | End: 2025-05-06

## 2024-11-07 RX ORDER — LEVOTHYROXINE SODIUM 137 UG/1
137 TABLET ORAL DAILY
Qty: 90 TABLET | Refills: 3 | Status: SHIPPED | OUTPATIENT
Start: 2024-11-07 | End: 2025-11-07

## 2024-11-07 ASSESSMENT — ENCOUNTER SYMPTOMS
LOSS OF SENSATION IN FEET: 0
WHEEZING: 0
CONFUSION: 0
ARTHRALGIAS: 0
HEMATURIA: 0
HEADACHES: 0
EYE PAIN: 0
ABDOMINAL PAIN: 0
VOMITING: 0
MYALGIAS: 0
DEPRESSION: 0
DIARRHEA: 0
NAUSEA: 0
CONSTIPATION: 0
COUGH: 0
NECK PAIN: 0
SORE THROAT: 0
DIZZINESS: 1
POLYDIPSIA: 0
SHORTNESS OF BREATH: 0
BRUISES/BLEEDS EASILY: 0
BLOOD IN STOOL: 0
PALPITATIONS: 0
SLEEP DISTURBANCE: 0
PHOTOPHOBIA: 0
SEIZURES: 0
BACK PAIN: 0
OCCASIONAL FEELINGS OF UNSTEADINESS: 0
FATIGUE: 0
UNEXPECTED WEIGHT CHANGE: 0
WOUND: 0
FLANK PAIN: 0
ACTIVITY CHANGE: 0
APPETITE CHANGE: 0
FEVER: 0
CHILLS: 0
JOINT SWELLING: 0
DYSURIA: 0
CHEST TIGHTNESS: 0
TROUBLE SWALLOWING: 0

## 2024-11-07 NOTE — PROGRESS NOTES
Subjective   Patient ID: Elizabet Butler is a 69 y.o. female who presents for Follow-up (6 month, concern about pedal pulse in left foot ) and Wrist Problem (Right, size difference ).  HPI    Patient presents in the office today as a follow up appointment.     Seen previously with complaints of Dizziness. Patient states that she has had three episodes of Dizziness. Standing during episodes. Just gotten up from a seated position. Standing in a check out line. Denies any syncopal episode. Discussed with Cardiology and recommended to decrease the Carvedilol to see if there was any benefit. Denies any nausea with episodes. Feels that everything is spinning. History of Migraine variant. Headaches with symptoms.       Thyroid had been uncontrolled. Improved with restarting the oral medication.      The patient states she has been doing well with her Type II Diabetes control since the last visit. Insurance did not cover Mounjaro but was able to restart. Did not tolerate Ozempic. Was tolerating Trulicity better but benefited better from Mounjaro. Restarted Mounjaro and tolerating well.      Comorbid Illnesses: hypertension, hyperlipidemia and obesity.   Disease Course and Complications:. there have been no previous episodes of diabetic ketoacidosis. there have been no previous hospitalizations. She has no significant interval events.      Following with [email or web address]. Following with Cardiology.      Has continued on Amitriptyline and Wellbutrin. Buspar PRN.     Review of Systems   Constitutional:  Negative for activity change, appetite change, chills, fatigue, fever and unexpected weight change.   HENT:  Negative for ear pain, hearing loss, nosebleeds, sore throat, tinnitus and trouble swallowing.    Eyes:  Negative for photophobia, pain and visual disturbance.   Respiratory:  Negative for cough, chest tightness, shortness of breath and wheezing.    Cardiovascular:  Negative for chest pain, palpitations and leg swelling.    Gastrointestinal:  Negative for abdominal pain, blood in stool, constipation, diarrhea, nausea and vomiting.   Endocrine: Negative for cold intolerance, heat intolerance, polydipsia and polyuria.   Genitourinary:  Negative for dysuria, flank pain and hematuria.   Musculoskeletal:  Negative for arthralgias, back pain, joint swelling, myalgias and neck pain.   Skin:  Negative for pallor, rash and wound.   Allergic/Immunologic: Negative for immunocompromised state.   Neurological:  Positive for dizziness. Negative for seizures and headaches.   Hematological:  Does not bruise/bleed easily.   Psychiatric/Behavioral:  Negative for confusion and sleep disturbance.        Objective   Physical Exam  Vitals and nursing note reviewed.   Constitutional:       General: She is not in acute distress.     Appearance: Normal appearance.   HENT:      Head: Normocephalic.      Nose: Nose normal.   Eyes:      Conjunctiva/sclera: Conjunctivae normal.   Neck:      Vascular: No carotid bruit.   Cardiovascular:      Rate and Rhythm: Normal rate and regular rhythm.      Pulses: Normal pulses.      Heart sounds: Normal heart sounds.   Pulmonary:      Effort: Pulmonary effort is normal.      Breath sounds: Normal breath sounds.   Abdominal:      General: Bowel sounds are normal.      Palpations: Abdomen is soft.   Musculoskeletal:         General: Normal range of motion.      Cervical back: Normal range of motion.   Skin:     General: Skin is warm and dry.   Neurological:      Mental Status: She is alert and oriented to person, place, and time. Mental status is at baseline.   Psychiatric:         Mood and Affect: Mood normal.         Behavior: Behavior normal.         Assessment/Plan        Reviewed most recent lab work available. Updated labs ordered.      Hypothyroidism. Lower Levothyroxine dosage. Reevaluate with next lab work.   Abnormal Calcium score. She had normal stress test in the summer of 2017. She has been seen by cardiology and  is now on a statin, baby aspirin a beta blocker. Yearly follow-up with cardiology.   PVD: Stable at this time. continue to monitor. Vascular referral placed by Cardiology.   Hypertension. Blood pressures normally well controlled. Will continue current medication. Patient previously discontinued ARB secondary to dizziness. Now lowered Carvedilol due to dizziness.   Insomnia, Anxiety, Depression, History of migraine variant. Doing well continue carvedilol and amitriptyline. Increased Amitriptyline with increased Depression. Continue Wellbutrin, discussed adjusting dosage as she is not sure that she still needs. Buspirone. Reevaluate at follow up appointment.   Diabetes mellitus type 2: A1C 5.8/%. Did not tolerate Ozempic. Initially continue Metformin, monitor blood sugars to discontinue. Continue to monitor hemoglobin A1c. Urine Albumin: April 2024. ARB discontinued by patient as above secondary to dizziness. No diabetic complications. Restarted Mounjaro.   Mild dyslipidemia. Continue atorvastatin 40 mg daily. Repeat lipid panel yearly is ordered per cardiology.  Obesity: BMI: 37.75. Lifestyle changes as noted above.  Vitamin B12 Deficiency: Adjust Vitamin B12.      DEXA scan May 2024, normal.   Normal mammogram May 2024.   Colonoscopy: December 2023, plan to repeat in 5 years.   Prevnar 20: August 2023.  Pneumovax: September 2019.   Flu Vaccine: November 2024.   Shingrix: October 2020, March 2021.   Medicare Wellness: May 2024.   Discussed Tdap.     Nelly Lucas, MICHAEL-CNS 11/07/24 9:30 AM

## 2024-11-12 ENCOUNTER — OFFICE VISIT (OUTPATIENT)
Dept: CARDIOLOGY | Facility: HOSPITAL | Age: 69
End: 2024-11-12
Payer: MEDICARE

## 2024-11-12 ENCOUNTER — PATIENT MESSAGE (OUTPATIENT)
Dept: PRIMARY CARE | Facility: CLINIC | Age: 69
End: 2024-11-12

## 2024-11-12 ENCOUNTER — OFFICE VISIT (OUTPATIENT)
Dept: URGENT CARE | Age: 69
End: 2024-11-12
Payer: MEDICARE

## 2024-11-12 ENCOUNTER — ANCILLARY PROCEDURE (OUTPATIENT)
Dept: URGENT CARE | Age: 69
End: 2024-11-12
Payer: MEDICARE

## 2024-11-12 VITALS
SYSTOLIC BLOOD PRESSURE: 158 MMHG | RESPIRATION RATE: 20 BRPM | OXYGEN SATURATION: 94 % | DIASTOLIC BLOOD PRESSURE: 88 MMHG | HEART RATE: 87 BPM | TEMPERATURE: 97.3 F

## 2024-11-12 VITALS
HEIGHT: 67 IN | WEIGHT: 236 LBS | BODY MASS INDEX: 37.04 KG/M2 | DIASTOLIC BLOOD PRESSURE: 74 MMHG | HEART RATE: 84 BPM | SYSTOLIC BLOOD PRESSURE: 138 MMHG

## 2024-11-12 DIAGNOSIS — I10 BENIGN ESSENTIAL HYPERTENSION: ICD-10-CM

## 2024-11-12 DIAGNOSIS — E11.69 HYPERLIPIDEMIA ASSOCIATED WITH TYPE 2 DIABETES MELLITUS (MULTI): ICD-10-CM

## 2024-11-12 DIAGNOSIS — M54.2 NECK PAIN: ICD-10-CM

## 2024-11-12 DIAGNOSIS — M54.2 CERVICALGIA: Primary | ICD-10-CM

## 2024-11-12 DIAGNOSIS — E78.5 HYPERLIPIDEMIA ASSOCIATED WITH TYPE 2 DIABETES MELLITUS (MULTI): ICD-10-CM

## 2024-11-12 DIAGNOSIS — R93.1 ELEVATED CORONARY ARTERY CALCIUM SCORE: Primary | ICD-10-CM

## 2024-11-12 PROCEDURE — 3075F SYST BP GE 130 - 139MM HG: CPT | Performed by: INTERNAL MEDICINE

## 2024-11-12 PROCEDURE — 99214 OFFICE O/P EST MOD 30 MIN: CPT | Performed by: INTERNAL MEDICINE

## 2024-11-12 PROCEDURE — 1159F MED LIST DOCD IN RCRD: CPT | Performed by: INTERNAL MEDICINE

## 2024-11-12 PROCEDURE — 4010F ACE/ARB THERAPY RXD/TAKEN: CPT | Performed by: INTERNAL MEDICINE

## 2024-11-12 PROCEDURE — 1157F ADVNC CARE PLAN IN RCRD: CPT | Performed by: INTERNAL MEDICINE

## 2024-11-12 PROCEDURE — 3061F NEG MICROALBUMINURIA REV: CPT | Performed by: INTERNAL MEDICINE

## 2024-11-12 PROCEDURE — 3008F BODY MASS INDEX DOCD: CPT | Performed by: INTERNAL MEDICINE

## 2024-11-12 PROCEDURE — 3078F DIAST BP <80 MM HG: CPT | Performed by: INTERNAL MEDICINE

## 2024-11-12 PROCEDURE — 1036F TOBACCO NON-USER: CPT | Performed by: INTERNAL MEDICINE

## 2024-11-12 PROCEDURE — 3044F HG A1C LEVEL LT 7.0%: CPT | Performed by: INTERNAL MEDICINE

## 2024-11-12 PROCEDURE — 3048F LDL-C <100 MG/DL: CPT | Performed by: INTERNAL MEDICINE

## 2024-11-12 PROCEDURE — 93010 ELECTROCARDIOGRAM REPORT: CPT | Performed by: INTERNAL MEDICINE

## 2024-11-12 PROCEDURE — 93005 ELECTROCARDIOGRAM TRACING: CPT | Performed by: INTERNAL MEDICINE

## 2024-11-12 PROCEDURE — 72040 X-RAY EXAM NECK SPINE 2-3 VW: CPT

## 2024-11-12 RX ORDER — METHYLPREDNISOLONE SODIUM SUCCINATE 125 MG/2ML
125 INJECTION INTRAMUSCULAR; INTRAVENOUS ONCE
Status: COMPLETED | OUTPATIENT
Start: 2024-11-12 | End: 2024-11-12

## 2024-11-12 NOTE — PATIENT INSTRUCTIONS
Thanks for following up in office today.    1)  We reviewed the results of your monitor that you wore.  We also discussed the results of your echocardiogram.     2)  Your blood pressure is a little high today, but I think that it is because you are in pain. I am not changing any of your meds today.    3)  Please continue your cardiac medications as prescribed.    Follow up with me in 6 months  If you have any questions, please call [phone] and choose option for Dr. Pang's nurse Nadine Mathur

## 2024-11-12 NOTE — PROGRESS NOTES
Chief Complaint:   No chief complaint on file.     History Of Present Illness:    Elizabet Butler is a 69 y.o. female presenting for three month follow up.  History of elevated CAC score (total score 1136.4), HTN, Orthostatic hypotension, Palpitations, and DM2.  Due to elevated CAC score, she did have a stress echo in 4/2024 - EKG was indeterminate due to baseline abnl, but there was no clinical or echocardiographic evidence of ischemia.    Last virtual visit she reported episodes of dizziness - I ordered an echocardiogram and holter monitor.  Holter demonstrated NSR, avg HR 83bpm.  TTE demonstrated normal LVEF 60%, normal RVSF.  Tells me   Today no active cardiac symptoms reported.  Elizabet has been having neck pain for the past four days.  Does not recall any trauma or positional issues.  Unable to really turn her neck due to the pain.    ROS:  The remainder of the review of systems was obtained, as was negative as pertains to the chief complaint.      CV testing and labs:  11/2024  H&H 11.9 37.3   K 3.7  BUN 9  crea 0.96 ast 14 alt 16  chol 136  HDL 61.6   LDL 55 trig 99  TSH 0.12  9/2024  TTE   EF 60% trace tricuspid regurg,   Monitor worn from 9/6-10/05/2024  Predominant rhythm was SR.  Max heart rate was 120  min 65  av 83, SVE/VE <1%. Three symptoms were lightheaded these correlated with ST, SR, ST with artifact.   Monitor worn from 4/2-14/2024  min heart rate 63  max 152  av 81.  Predominant SR.  One run SVT 16 beats max rate of 152.  SVE's/Ve's < 1%.  Poor tracing quality makes identification of p wave difficult, particularly during SVT.   Stress test 4/2023  Summary:  1. Indeterminate due to baseline ST segment abnormality.  2. Indeterminate Stress Test.  3. No clinical evidence for ischemia at maximal infusion.  4. No ECG changes from baseline.  5. The adequate level of stress was achieved.  CT CAC score 3/2023  LM  0,   LAD  848   Lcx  32.2  RCA  256.2  total 1136.4  Last Recorded Vitals:  There were no  vitals filed for this visit.    Past Medical History:  She has a past medical history of Anxiety disorder, unspecified, Body mass index (BMI) 34.0-34.9, adult (10/16/2019), Body mass index (BMI) 38.0-38.9, adult (2021), Body mass index (BMI) 39.0-39.9, adult (2021), Cellulitis of unspecified part of limb (2021), COVID-19 (2021), DM2 (diabetes mellitus, type 2) (Multi), Hyperlipidemia, Hypertension, Major depressive disorder, single episode, in full remission (CMS-HCC) (2021), Major depressive disorder, single episode, moderate (Multi) (2022), Personal history of other diseases of the digestive system (2020), Personal history of other diseases of the musculoskeletal system and connective tissue, Personal history of other diseases of the nervous system and sense organs, Personal history of other endocrine, nutritional and metabolic disease, and Personal history of other specified conditions.    Past Surgical History:  She has a past surgical history that includes  section, classic (2017) and Cholecystectomy (2017).      Social History:  She reports that she has never smoked. She has never used smokeless tobacco. She reports that she does not drink alcohol and does not use drugs.    Family History:  Family History   Problem Relation Name Age of Onset    Cancer Father      Hypertension Father      Heart disease Father          Allergies:  Patient has no known allergies.    Outpatient Medications:  Current Outpatient Medications   Medication Instructions    amitriptyline (ELAVIL) 30 mg, oral, Nightly    aspirin 81 mg chewable tablet 1 tablet, Daily    atorvastatin (LIPITOR) 60 mg, oral, Nightly    blood sugar diagnostic strip 1 strip, miscellaneous, Daily    blood-glucose meter misc Check blood sugars once daily    buPROPion XL (WELLBUTRIN XL) 300 mg, oral, Daily    busPIRone (BUSPAR) 7.5 mg, oral, As needed    carvedilol (COREG) 12.5 mg, oral, 2 times daily  (morning and late afternoon)    cholecalciferol (VITAMIN D3) 5,000 Units, Daily    hydroCHLOROthiazide (MICROZIDE) 12.5 mg, oral, Daily    lancets misc Check blood sugars daily    levothyroxine (SYNTHROID, LEVOXYL) 137 mcg, oral, Daily    losartan (COZAAR) 100 mg, oral, Daily    metFORMIN XR (GLUCOPHAGE-XR) 750 mg, oral, Daily, as directed    Mounjaro 7.5 mg, subcutaneous, Weekly    omeprazole (PRILOSEC) 40 mg, oral, Daily    ondansetron (ZOFRAN) 4 mg, Every 8 hours PRN    OneTouch Delica Plus Lancet 30 gauge misc USE 1 TO CHECK GLUCOSE ONCE DAILY    OneTouch Ultra Test strip USE 1 STRIP TO CHECK GLUCOSE TWICE DAILY       Physical Exam:  Physical Exam  HENT:      Head: Normocephalic.      Nose: Nose normal.      Mouth/Throat:      Mouth: Mucous membranes are moist.   Cardiovascular:      Rate and Rhythm: Normal rate.      Comments: A little bit tachycardic  No carotid bruits   No leg swelling   Pulmonary:      Effort: Pulmonary effort is normal.      Breath sounds: Normal breath sounds.   Abdominal:      Palpations: Abdomen is soft.   Musculoskeletal:         General: Normal range of motion.      Cervical back: Normal range of motion.      Comments: C/o Stiff neck    Skin:     General: Skin is warm and dry.   Neurological:      General: No focal deficit present.      Mental Status: She is alert.   Psychiatric:         Mood and Affect: Mood normal.            Last Labs:  CBC -  Lab Results   Component Value Date    WBC 5.5 11/04/2024    HGB 11.9 (L) 11/04/2024    HCT 37.3 11/04/2024    MCV 91 11/04/2024     11/04/2024       CMP -  Lab Results   Component Value Date    CALCIUM 9.0 11/04/2024    PROT 6.6 11/04/2024    ALBUMIN 3.8 11/04/2024    AST 14 11/04/2024    ALT 16 11/04/2024    ALKPHOS 82 11/04/2024    BILITOT 0.5 11/04/2024       LIPID PANEL -   Lab Results   Component Value Date    CHOL 136 11/04/2024    TRIG 99 11/04/2024    HDL 61.6 11/04/2024    CHHDL 2.2 11/04/2024    LDLF 62 06/30/2023    VLDL 20  11/04/2024    NHDL 74 11/04/2024       RENAL FUNCTION PANEL -   Lab Results   Component Value Date    GLUCOSE 82 11/04/2024     11/04/2024    K 3.7 11/04/2024     11/04/2024    CO2 30 11/04/2024    ANIONGAP 12 11/04/2024    BUN 9 11/04/2024    CREATININE 0.96 11/04/2024    CALCIUM 9.0 11/04/2024    ALBUMIN 3.8 11/04/2024        Lab Results   Component Value Date    HGBA1C 5.8 (H) 11/04/2024         Assessment/Plan   Elevated CAC score > 1000  Presyncope  HTN  Varicose veins     No angina.  Having stiff/painful neck - difficult to move neck due to pain.     Plan:  -referred to urgent care for neck stiffness/pain  -continue ASA 81mg daily  -atorvastatin to 60mg daily - advised she hold her statin for a few days  -stay on lower dose carvedilol 12.5mg, half tab bid  -hydrochlorothiazide 12.5mg daily  -advised to stay hydrated while on diuretic  -losartan 100mg daily  -referral to [facility] medicine for PVD    Elaina Pang MD

## 2024-11-12 NOTE — PROGRESS NOTES
Subjective   History of Present Illness: Elizabet Butler is a 69 y.o. female. They present today with a chief complaint of neck pain that radiates to the L. Side of her shoulder. No recent injury or trauma to her head. Limited ROM d/t pain. OTC meds didn't help. No other complaints.        Past Medical History  Allergies as of 2024    (No Known Allergies)       (Not in a hospital admission)       Past Medical History:   Diagnosis Date    Anxiety disorder, unspecified     Anxiety    Body mass index (BMI) 34.0-34.9, adult 10/16/2019    Body mass index (BMI) of 34.0 to 34.9 in adult    Body mass index (BMI) 38.0-38.9, adult 2021    Body mass index (BMI) of 38.0 to 38.9 in adult    Body mass index (BMI) 39.0-39.9, adult 2021    Body mass index (BMI) of 39.0 to 39.9 in adult    Cellulitis of unspecified part of limb 2021    Ankle cellulitis    COVID-19 2021    COVID-19    DM2 (diabetes mellitus, type 2) (Multi)     Hyperlipidemia     Hypertension     Major depressive disorder, single episode, in full remission (CMS-HCC) 2021    Depression, major, in remission    Major depressive disorder, single episode, moderate (Multi) 2022    Moderate major depression    Personal history of other diseases of the digestive system 2020    History of gastroesophageal reflux (GERD)    Personal history of other diseases of the musculoskeletal system and connective tissue     History of osteoarthritis    Personal history of other diseases of the nervous system and sense organs     History of migraine    Personal history of other endocrine, nutritional and metabolic disease     History of hypothyroidism    Personal history of other specified conditions     History of chest pain       Past Surgical History:   Procedure Laterality Date     SECTION, CLASSIC  2017     Section    CHOLECYSTECTOMY  2017    Cholecystectomy        reports that she has never smoked. She has never  used smokeless tobacco. She reports that she does not drink alcohol and does not use drugs.    Review of Systems  Review of Systems                               Objective    Vitals:    11/12/24 1750   BP: 158/88   Pulse: 87   Resp: 20   Temp: 36.3 °C (97.3 °F)   SpO2: 94%     No LMP recorded (lmp unknown). Patient is postmenopausal.    Physical Exam  HENT:      Head: Normocephalic and atraumatic.      Nose: Nose normal.      Mouth/Throat:      Mouth: Mucous membranes are moist.   Eyes:      Extraocular Movements: Extraocular movements intact.      Conjunctiva/sclera: Conjunctivae normal.      Pupils: Pupils are equal, round, and reactive to light.   Cardiovascular:      Rate and Rhythm: Normal rate and regular rhythm.   Pulmonary:      Effort: Pulmonary effort is normal.      Breath sounds: Normal breath sounds.   Musculoskeletal:      Cervical back: Rigidity present. Pain with movement and muscular tenderness present. No spinous process tenderness. Decreased range of motion.   Skin:     General: Skin is warm.   Neurological:      Mental Status: She is alert and oriented to person, place, and time.   Psychiatric:         Mood and Affect: Mood normal.         Behavior: Behavior normal.             Point of Care Test & Imaging Results from this visit  No results found for this visit on 11/12/24.   XR cervical spine 2-3 views    Result Date: 11/12/2024  Interpreted By:  Maisha Hadley, STUDY: Cervical spine, 4 views.   INDICATION: Signs/Symptoms:neck pain.   COMPARISON: None.   ACCESSION NUMBER(S): [id number]   ORDERING CLINICIAN: AYLA MERCADO   FINDINGS: Alignment is within normal limits. Moderate C5-6 spondylosis with disc height loss and osteophytes. Open-mouth view is unremarkable Vertebral body heights are preserved. Posterior elements are intact. Prevertebral soft tissues are unremarkable.       1. Moderate C5-6 spondylosis.   MACRO: None.   Signed by: Maisha Hadley 11/12/2024 6:33 PM Dictation  workstation:   [id number]    ECG 12 Lead    Result Date: 11/12/2024  NSR HR 84     Diagnostic study results (if any) were reviewed by Ibrahima Gerber PA-C.    Assessment/Plan   Allergies, medications, history, and pertinent labs/EKGs/Imaging reviewed by Ibrahima Gerber PA-C.     Medical Decision Making  -         Patient is educated about their diagnoses.     -          Discussed medications benefits and adverse effects.     -          Answered all patient’s questions.     -          Patient will call 911 or go to the nearest ED if worsen symptoms .     -          Patient is agreeable to the plan of care and is deemed stable upon discharge.     -          Follow up with your primary care provider in two days.      Orders and Diagnoses  Diagnoses and all orders for this visit:  Cervicalgia  -     methylPREDNISolone sodium succinate (PF) (SOLU-Medrol) injection 125 mg  -     Cervical, Flexible; Non-Adjustable (Foam Collar)      Medical Admin Record  Administrations This Visit       methylPREDNISolone sodium succinate (PF) (SOLU-Medrol) injection 125 mg       Admin Date  11/12/2024 Action  Given Dose  125 mg Route  intramuscular Documented By  Julius Gonzalez MA                    Patient disposition: Home    Electronically signed by Ibrahima Gerber PA-C  7:27 PM

## 2024-12-02 ENCOUNTER — EVALUATION (OUTPATIENT)
Dept: PHYSICAL THERAPY | Facility: HOSPITAL | Age: 69
End: 2024-12-02
Payer: MEDICARE

## 2024-12-02 DIAGNOSIS — R29.3 POSTURE ABNORMALITY: Primary | ICD-10-CM

## 2024-12-02 DIAGNOSIS — M54.2 NECK PAIN: ICD-10-CM

## 2024-12-02 PROCEDURE — 97110 THERAPEUTIC EXERCISES: CPT | Mod: GP | Performed by: PHYSICAL THERAPIST

## 2024-12-02 PROCEDURE — 97161 PT EVAL LOW COMPLEX 20 MIN: CPT | Mod: GP | Performed by: PHYSICAL THERAPIST

## 2024-12-02 ASSESSMENT — PAIN - FUNCTIONAL ASSESSMENT: PAIN_FUNCTIONAL_ASSESSMENT: 0-10

## 2024-12-02 ASSESSMENT — ENCOUNTER SYMPTOMS
OCCASIONAL FEELINGS OF UNSTEADINESS: 1
DEPRESSION: 1
LOSS OF SENSATION IN FEET: 0

## 2024-12-02 ASSESSMENT — PAIN SCALES - GENERAL: PAINLEVEL_OUTOF10: 0 - NO PAIN

## 2024-12-02 NOTE — PATIENT INSTRUCTIONS
Access Code: [email or web address]  URL: [email or web address]  Date: 12/02/2024  Prepared by: Johan Islas    Exercises  - Correct Seated Posture   - Seated Cervical Retraction Protraction AROM  - 1 x daily - 1 sets - 10 reps - 3 seconds hold  - Seated Scapular Retraction  - 2 x daily - 1 sets - 10 reps - 3 seconds hold  - Doorway Pec Stretch at 90 Degrees Abduction  - 2 x daily - 3 sets - 30 seconds hold  - Band Pull Downs  - 3 x weekly - 2-3 sets - 10 reps  - Standing Row with Anchored Resistance  - 3 x weekly - 2-3 sets - 10 reps  - Shoulder External Rotation and Scapular Retraction with Resistance  - 3 x weekly - 2-3 sets - 10 reps

## 2024-12-02 NOTE — PROGRESS NOTES
Physical Therapy    Physical Therapy Evaluation and Treatment      Patient Name: Elizabet Butler  MRN: [id number]  : 1955   Today's Date: 2024  Time Calculation  Start Time: 1300  Stop Time: 1345  Time Calculation (min): 45 min     PT Evaluation Time Entry  PT Evaluation (Low) Time Entry: 30  PT Therapeutic Procedures Time Entry  Therapeutic Exercise Time Entry: 15          Visit # 1    Assessment: Pt feels she has returned to baseline as far as her neck pain is concerned She is in agreement to continue with PT independently with a HEP issued today of cervical stabilization and postural re-education.   PT Assessment Results: Pain (Posture)  Rehab Prognosis: Excellent    Plan: Hold PT. She will call if further PT needed in next 30 days, otherwise she will be discharged from PT.  Treatment/Interventions: Education/ Instruction, Manual therapy, Therapeutic exercises  PT Plan: Skilled PT  PT Frequency: 1 time per week  Duration: 4 wks  Rehab Potential: Excellent  Plan of Care Agreement: Patient    Current Problem:   1. Posture abnormality  Follow Up In Physical Therapy      2. Neck pain  Referral to Physical Therapy    Follow Up In Physical Therapy          Subjective      Chief complaint: Pt c/o a long Hx of intermittent neck pain that became really intense a few weeks ago. An injection at urgent care has really helped, taking her back to baseline. Can be worse when her chronic LBP flares up.     Pain Better: Injection, ice, chiropractic    Pain Worse: unsure    Imaging: Moderate C5-6 spondylosis.     Prior level of function: chronic LBP    Current limitations: none    Home setup: Lives alone    Work: Retired    Patient's goal: Following MD orders, wishes to avoid flare ups    Precautions:  Precautions  STEADI Fall Risk Score (The score of 4 or more indicates an increased risk of falling): 8  Precautions Comment: None    Pain:  Pain Assessment  Pain Assessment: 0-10  0-10 (Numeric) Pain Score: 0 - No pain  (1/10 max)    Objective:  Objective   Posture: cervical step-off, mod forward head/shoulders    Cervical ROM  Grossly WNL    UE Strength:  5/5 johan throughout    TTP PA C5-T2, right upper traps trigger point    Outcome Measures:  Other Measures  Neck Disability Index: 9     Treatments:  EXERCISES Date  Date  Date Date   VISIT# # # # #    REPS REPS REPS REPS          Pulleys              UE Bike              T-band:       Pull downs       Mid rows       Johan ER                     Wall push-ups              Corner pec stretch                     Manual:       IASTM       Traction with suboccipital release       UT/LS stretching       CT mobs                     Mechanical cervical traction                                                               HEP: Access Code: [email or web address]  URL: [email or web address]  Date: 12/02/2024  Prepared by: Johan Islas    Exercises  - Correct Seated Posture   - Seated Cervical Retraction Protraction AROM  - 1 x daily - 1 sets - 10 reps - 3 seconds hold  - Seated Scapular Retraction  - 2 x daily - 1 sets - 10 reps - 3 seconds hold  - Doorway Pec Stretch at 90 Degrees Abduction  - 2 x daily - 3 sets - 30 seconds hold  - Band Pull Downs  - 3 x weekly - 2-3 sets - 10 reps  - Standing Row with Anchored Resistance  - 3 x weekly - 2-3 sets - 10 reps  - Shoulder External Rotation and Scapular Retraction with Resistance  - 3 x weekly - 2-3 sets - 10 reps          Goals:  Active       Neck pain       <=1/10 neck pain for improved daily activities.        Start:  12/02/24    Expected End:  03/02/25            Improve NDI by 8 points for improved mobility.        Start:  12/02/24    Expected End:  03/02/25            Independent HEP for continued gains after PT is complete.        Start:  12/02/24    Expected End:  03/02/25            Pt will demonstrate improved sitting posture to decrease stress on neck and back.        Start:  12/02/24    Expected End:  03/02/25

## 2024-12-03 ENCOUNTER — TELEPHONE (OUTPATIENT)
Dept: CARDIOLOGY | Facility: HOSPITAL | Age: 69
End: 2024-12-03
Payer: MEDICARE

## 2024-12-03 NOTE — TELEPHONE ENCOUNTER
12/3 - Patient called and LVM requesting to speak with Nadine RN regarding medication changes at last appt with Dr. Pang on 11/12. Would like to clarify instructions for lipitor dosage. Routing to Nadine RN for further assistance.

## 2024-12-04 NOTE — TELEPHONE ENCOUNTER
Called patient back and reviewed note with her.  She was told that she could hold her atorvastatin for a few day.  She expressed understanding and told me that she did already resume it.

## 2024-12-09 ENCOUNTER — PHARMACY VISIT (OUTPATIENT)
Dept: PHARMACY | Facility: CLINIC | Age: 69
End: 2024-12-09
Payer: MEDICARE

## 2024-12-09 PROCEDURE — RXMED WILLOW AMBULATORY MEDICATION CHARGE

## 2024-12-20 ENCOUNTER — PATIENT MESSAGE (OUTPATIENT)
Dept: PRIMARY CARE | Facility: CLINIC | Age: 69
End: 2024-12-20
Payer: MEDICARE

## 2024-12-20 DIAGNOSIS — E11.9 TYPE 2 DIABETES MELLITUS WITHOUT COMPLICATION, WITHOUT LONG-TERM CURRENT USE OF INSULIN (MULTI): ICD-10-CM

## 2024-12-21 RX ORDER — TIRZEPATIDE 7.5 MG/.5ML
7.5 INJECTION, SOLUTION SUBCUTANEOUS WEEKLY
Qty: 2 ML | Refills: 2 | Status: SHIPPED | OUTPATIENT
Start: 2024-12-21

## 2024-12-21 RX ORDER — TIRZEPATIDE 7.5 MG/.5ML
7.5 INJECTION, SOLUTION SUBCUTANEOUS WEEKLY
Qty: 2 ML | Refills: 2 | Status: SHIPPED | OUTPATIENT
Start: 2024-12-21 | End: 2024-12-21 | Stop reason: SDUPTHER

## 2024-12-30 ENCOUNTER — PHARMACY VISIT (OUTPATIENT)
Dept: PHARMACY | Facility: CLINIC | Age: 69
End: 2024-12-30
Payer: MEDICARE

## 2024-12-30 PROCEDURE — RXMED WILLOW AMBULATORY MEDICATION CHARGE

## 2025-01-14 DIAGNOSIS — F41.9 ANXIETY: ICD-10-CM

## 2025-01-14 RX ORDER — BUPROPION HYDROCHLORIDE 300 MG/1
300 TABLET ORAL DAILY
Qty: 90 TABLET | Refills: 3 | Status: SHIPPED | OUTPATIENT
Start: 2025-01-14 | End: 2026-01-14

## 2025-01-14 NOTE — TELEPHONE ENCOUNTER
Rand, Elizabet A  [place] [address] Clinical Support Staff  Phone Number: [phone]     Peterson Villegas.  I need refills for Bupropion XL 300mg qday  [facility] [place].  Guy Villegas

## 2025-01-20 ENCOUNTER — OFFICE VISIT (OUTPATIENT)
Dept: CARDIOLOGY | Facility: CLINIC | Age: 70
End: 2025-01-20
Payer: MEDICARE

## 2025-01-20 VITALS
OXYGEN SATURATION: 98 % | SYSTOLIC BLOOD PRESSURE: 101 MMHG | BODY MASS INDEX: 35.31 KG/M2 | HEART RATE: 84 BPM | HEIGHT: 67 IN | WEIGHT: 225 LBS | DIASTOLIC BLOOD PRESSURE: 66 MMHG

## 2025-01-20 DIAGNOSIS — I83.813 VARICOSE VEINS OF BILATERAL LOWER EXTREMITIES WITH PAIN: Primary | ICD-10-CM

## 2025-01-20 PROCEDURE — 99213 OFFICE O/P EST LOW 20 MIN: CPT | Performed by: INTERNAL MEDICINE

## 2025-01-20 PROCEDURE — 3074F SYST BP LT 130 MM HG: CPT | Performed by: INTERNAL MEDICINE

## 2025-01-20 PROCEDURE — 1157F ADVNC CARE PLAN IN RCRD: CPT | Performed by: INTERNAL MEDICINE

## 2025-01-20 PROCEDURE — 3078F DIAST BP <80 MM HG: CPT | Performed by: INTERNAL MEDICINE

## 2025-01-20 PROCEDURE — 3008F BODY MASS INDEX DOCD: CPT | Performed by: INTERNAL MEDICINE

## 2025-01-20 PROCEDURE — 1160F RVW MEDS BY RX/DR IN RCRD: CPT | Performed by: INTERNAL MEDICINE

## 2025-01-20 PROCEDURE — 4010F ACE/ARB THERAPY RXD/TAKEN: CPT | Performed by: INTERNAL MEDICINE

## 2025-01-20 PROCEDURE — 1036F TOBACCO NON-USER: CPT | Performed by: INTERNAL MEDICINE

## 2025-01-20 PROCEDURE — 1159F MED LIST DOCD IN RCRD: CPT | Performed by: INTERNAL MEDICINE

## 2025-01-20 PROCEDURE — 1126F AMNT PAIN NOTED NONE PRSNT: CPT | Performed by: INTERNAL MEDICINE

## 2025-01-20 ASSESSMENT — ENCOUNTER SYMPTOMS
OCCASIONAL FEELINGS OF UNSTEADINESS: 1
LOSS OF SENSATION IN FEET: 0
DEPRESSION: 0

## 2025-01-20 ASSESSMENT — PAIN SCALES - GENERAL: PAINLEVEL_OUTOF10: 0-NO PAIN

## 2025-01-20 NOTE — PROGRESS NOTES
Referred by Elaina Pang MD      History of Present Illness:  Elizabet Butler is a/an 69 y.o. woman with longstanding bilateral varicose veins referred for same. She reports intermittent leg aching, heaviness, and occasional itching. Veins are often tender. She denies restless legs and cramps.    She has a history of bleeding varicose vein when she nicked it shaving. No personal history of thrombosis.    She has a strong family history of varicose veins in both parents and sibling.    She is a retired nurse. Wears KYLEE hose most of the time.     Past Medical History:   Diagnosis Date    Anxiety disorder, unspecified     Anxiety    Body mass index (BMI) 34.0-34.9, adult 10/16/2019    Body mass index (BMI) of 34.0 to 34.9 in adult    Body mass index (BMI) 38.0-38.9, adult 2021    Body mass index (BMI) of 38.0 to 38.9 in adult    Body mass index (BMI) 39.0-39.9, adult 2021    Body mass index (BMI) of 39.0 to 39.9 in adult    Cellulitis of unspecified part of limb 2021    Ankle cellulitis    COVID-19 2021    COVID-19    DM2 (diabetes mellitus, type 2) (Multi)     Hyperlipidemia     Hypertension     Major depressive disorder, single episode, in full remission (CMS-HCC) 2021    Depression, major, in remission    Major depressive disorder, single episode, moderate (Multi) 2022    Moderate major depression    Personal history of other diseases of the digestive system 2020    History of gastroesophageal reflux (GERD)    Personal history of other diseases of the musculoskeletal system and connective tissue     History of osteoarthritis    Personal history of other diseases of the nervous system and sense organs     History of migraine    Personal history of other endocrine, nutritional and metabolic disease     History of hypothyroidism    Personal history of other specified conditions     History of chest pain     Past Surgical History:   Procedure Laterality Date     SECTION,  CLASSIC  2017     Section    CHOLECYSTECTOMY  2017    Cholecystectomy     Social History     Tobacco Use    Smoking status: Former     Current packs/day: 0.00     Types: Cigarettes     Quit date:      Years since quittin.0    Smokeless tobacco: Never   Vaping Use    Vaping status: Never Used   Substance Use Topics    Alcohol use: Never    Drug use: Never     Family History   Problem Relation Name Age of Onset    Cancer Father      Hypertension Father      Heart disease Father       Current Outpatient Medications   Medication Sig Dispense Refill    amitriptyline (Elavil) 10 mg tablet Take 3 tablets (30 mg) by mouth once daily at bedtime. 270 tablet 1    aspirin 81 mg chewable tablet Chew 1 tablet (81 mg) once daily.      atorvastatin (Lipitor) 40 mg tablet Take 1.5 tablets (60 mg) by mouth once daily at bedtime. 135 tablet 3    blood sugar diagnostic strip 1 strip once daily. 100 strip 11    blood-glucose meter misc Check blood sugars once daily 1 each 0    buPROPion XL (Wellbutrin XL) 300 mg 24 hr tablet Take 1 tablet (300 mg) by mouth once daily. 90 tablet 3    busPIRone (Buspar) 7.5 mg tablet TAKE 1 TABLET BY MOUTH AS NEEDED FOR ANXIETY 30 tablet 2    carvedilol (Coreg) 12.5 mg tablet Take 1 tablet (12.5 mg) by mouth 2 times a day with meals. 180 tablet 3    cholecalciferol (Vitamin D3) 5,000 Units tablet Take 1 tablet (5,000 Units) by mouth once daily.      hydroCHLOROthiazide (Microzide) 12.5 mg tablet Take 1 tablet (12.5 mg) by mouth once daily. 90 tablet 3    lancets misc Check blood sugars daily 100 each 11    levothyroxine (Synthroid, Levoxyl) 137 mcg tablet Take 1 tablet (137 mcg) by mouth once daily. 90 tablet 3    losartan (Cozaar) 100 mg tablet Take 1 tablet (100 mg) by mouth once daily. 90 tablet 3    metFORMIN XR (Glucophage-XR) 750 mg 24 hr tablet Take 1 tablet (750 mg) by mouth once daily. as directed 90 tablet 3    omeprazole (PriLOSEC) 40 mg DR capsule Take 1 capsule  "(40 mg) by mouth once daily. 90 capsule 1    ondansetron (Zofran) 4 mg tablet Take 1 tablet (4 mg) by mouth every 8 hours if needed.      OneTouch Delica Plus Lancet 30 gauge misc USE 1 TO CHECK GLUCOSE ONCE DAILY      OneTouch Ultra Test strip USE 1 STRIP TO CHECK GLUCOSE TWICE DAILY      tirzepatide (Mounjaro) 7.5 mg/0.5 mL pen injector Inject 7.5 mg under the skin 1 (one) time per week. 2 mL 2     Current Facility-Administered Medications   Medication Dose Route Frequency Provider Last Rate Last Admin    cyanocobalamin (Vitamin B-12) injection 1,000 mcg  1,000 mcg intramuscular Once Nelly Lucas, MICHAEL-CNS           Physical Examination:  Blood pressure 101/66, pulse 84, height 1.702 m (5' 7\"), weight 102 kg (225 lb), SpO2 98%.  No distress  Trace ankle edema  Bilateral dilated and tortuous varicose veins, reticular veins, and spider telangiectasias   Hyperpigmentation of gaiters and hemosiderin staining  No ulceration, weeping, or drainage      Pertinent Labs:    Pertinent Imaging:    Diagnoses and all orders for this visit:  Varicose veins of bilateral lower extremities with pain (Primary)  -     Referral to Vascular Surgery; Future  -     Compression Stockings 20-30 mmHg  Refer to vasc surgery to evaluate for venous ablation or other advanced therapy.    Bernarda Sharp MD, MS      "

## 2025-01-20 NOTE — PATIENT INSTRUCTIONS
I would like you to get scheduled with Carina Ca CNP, who is the nurse practitioner who works with our vein surgeon Dr. Nallely Macias. You can see Carina here in [place].    I would like you to get measured and fitted for graduated compression socks at [facility] in [place]. I will fax a prescription to them tomorrow when our printers are back up!    Should you have questions, please do not hesitate to call the office at [phone], or you can reach me on [employer] if you have signed up for it. If you have urgent concerns, call the office, do not use [employer].

## 2025-02-17 PROCEDURE — RXMED WILLOW AMBULATORY MEDICATION CHARGE

## 2025-02-18 ENCOUNTER — PATIENT MESSAGE (OUTPATIENT)
Dept: PRIMARY CARE | Facility: CLINIC | Age: 70
End: 2025-02-18
Payer: MEDICARE

## 2025-02-18 DIAGNOSIS — E11.9 TYPE 2 DIABETES MELLITUS WITHOUT COMPLICATION, WITHOUT LONG-TERM CURRENT USE OF INSULIN (MULTI): ICD-10-CM

## 2025-02-18 RX ORDER — TIRZEPATIDE 7.5 MG/.5ML
7.5 INJECTION, SOLUTION SUBCUTANEOUS WEEKLY
Qty: 2 ML | Refills: 2 | Status: SHIPPED | OUTPATIENT
Start: 2025-02-18

## 2025-02-19 ENCOUNTER — PHARMACY VISIT (OUTPATIENT)
Dept: PHARMACY | Facility: CLINIC | Age: 70
End: 2025-02-19
Payer: MEDICARE

## 2025-02-20 ENCOUNTER — APPOINTMENT (OUTPATIENT)
Dept: VASCULAR SURGERY | Facility: CLINIC | Age: 70
End: 2025-02-20
Payer: MEDICARE

## 2025-02-20 ENCOUNTER — DOCUMENTATION (OUTPATIENT)
Dept: PHYSICAL THERAPY | Facility: HOSPITAL | Age: 70
End: 2025-02-20
Payer: MEDICARE

## 2025-02-20 NOTE — PROGRESS NOTES
Physical Therapy    Discharge Summary    Name: Elizabet Butler  MRN: [id number]  : 1955  Date: 25    Discharge Information   Date of discharge: 25   Date of last visit: 24   Date of evaluation: 24   Number of attended visits: 1   Referred by: ABHINAV Ng   Referred for: Neck pain    Therapy Summary: At the initial evaluation, pt felt she had already returned to baseline as far as her neck pain was concerned. She was in agreement to continue with PT independently with a HEP of cervical stabilization and postural re-education and only follow-up with PT as needed in the next 30 days. She did not follow-up with PT.    Rehab Discharge Reason: Patient requested due to having returned to baseline and wishing to work independently on HEP.

## 2025-03-24 ENCOUNTER — HOSPITAL ENCOUNTER (EMERGENCY)
Facility: HOSPITAL | Age: 70
Discharge: HOME | End: 2025-03-24
Attending: EMERGENCY MEDICINE
Payer: MEDICARE

## 2025-03-24 ENCOUNTER — APPOINTMENT (OUTPATIENT)
Dept: CARDIOLOGY | Facility: HOSPITAL | Age: 70
End: 2025-03-24
Payer: MEDICARE

## 2025-03-24 ENCOUNTER — APPOINTMENT (OUTPATIENT)
Dept: RADIOLOGY | Facility: HOSPITAL | Age: 70
End: 2025-03-24
Payer: MEDICARE

## 2025-03-24 VITALS
HEART RATE: 70 BPM | OXYGEN SATURATION: 99 % | RESPIRATION RATE: 16 BRPM | TEMPERATURE: 97.9 F | HEIGHT: 67 IN | BODY MASS INDEX: 32.96 KG/M2 | SYSTOLIC BLOOD PRESSURE: 112 MMHG | DIASTOLIC BLOOD PRESSURE: 69 MMHG | WEIGHT: 210 LBS

## 2025-03-24 DIAGNOSIS — I95.1 ORTHOSTATIC HYPOTENSION: Primary | ICD-10-CM

## 2025-03-24 DIAGNOSIS — E83.42 HYPOMAGNESEMIA: ICD-10-CM

## 2025-03-24 LAB
ALBUMIN SERPL BCP-MCNC: 4.2 G/DL (ref 3.4–5)
ALP SERPL-CCNC: 88 U/L (ref 33–136)
ALT SERPL W P-5'-P-CCNC: 17 U/L (ref 7–45)
ANION GAP SERPL CALC-SCNC: 10 MMOL/L (ref 10–20)
APPEARANCE UR: CLEAR
AST SERPL W P-5'-P-CCNC: 19 U/L (ref 9–39)
BASOPHILS # BLD AUTO: 0.07 X10*3/UL (ref 0–0.1)
BASOPHILS NFR BLD AUTO: 1 %
BILIRUB SERPL-MCNC: 0.5 MG/DL (ref 0–1.2)
BILIRUB UR STRIP.AUTO-MCNC: NEGATIVE MG/DL
BNP SERPL-MCNC: 59 PG/ML (ref 0–99)
BUN SERPL-MCNC: 9 MG/DL (ref 6–23)
CALCIUM SERPL-MCNC: 10.3 MG/DL (ref 8.6–10.3)
CARDIAC TROPONIN I PNL SERPL HS: 3 NG/L (ref 0–13)
CARDIAC TROPONIN I PNL SERPL HS: 3 NG/L (ref 0–13)
CHLORIDE SERPL-SCNC: 98 MMOL/L (ref 98–107)
CO2 SERPL-SCNC: 33 MMOL/L (ref 21–32)
COLOR UR: ABNORMAL
CREAT SERPL-MCNC: 1.07 MG/DL (ref 0.5–1.05)
EGFRCR SERPLBLD CKD-EPI 2021: 56 ML/MIN/1.73M*2
EOSINOPHIL # BLD AUTO: 0.07 X10*3/UL (ref 0–0.7)
EOSINOPHIL NFR BLD AUTO: 1 %
ERYTHROCYTE [DISTWIDTH] IN BLOOD BY AUTOMATED COUNT: 14 % (ref 11.5–14.5)
GLUCOSE SERPL-MCNC: 87 MG/DL (ref 74–99)
GLUCOSE UR STRIP.AUTO-MCNC: NORMAL MG/DL
HCT VFR BLD AUTO: 40 % (ref 36–46)
HGB BLD-MCNC: 13.2 G/DL (ref 12–16)
IMM GRANULOCYTES # BLD AUTO: 0.02 X10*3/UL (ref 0–0.7)
IMM GRANULOCYTES NFR BLD AUTO: 0.3 % (ref 0–0.9)
KETONES UR STRIP.AUTO-MCNC: NEGATIVE MG/DL
LEUKOCYTE ESTERASE UR QL STRIP.AUTO: ABNORMAL
LYMPHOCYTES # BLD AUTO: 1.56 X10*3/UL (ref 1.2–4.8)
LYMPHOCYTES NFR BLD AUTO: 22.8 %
MAGNESIUM SERPL-MCNC: 1.57 MG/DL (ref 1.6–2.4)
MCH RBC QN AUTO: 29.6 PG (ref 26–34)
MCHC RBC AUTO-ENTMCNC: 33 G/DL (ref 32–36)
MCV RBC AUTO: 90 FL (ref 80–100)
MONOCYTES # BLD AUTO: 0.44 X10*3/UL (ref 0.1–1)
MONOCYTES NFR BLD AUTO: 6.4 %
NEUTROPHILS # BLD AUTO: 4.69 X10*3/UL (ref 1.2–7.7)
NEUTROPHILS NFR BLD AUTO: 68.5 %
NITRITE UR QL STRIP.AUTO: NEGATIVE
NRBC BLD-RTO: 0 /100 WBCS (ref 0–0)
PH UR STRIP.AUTO: 7 [PH]
PLATELET # BLD AUTO: 353 X10*3/UL (ref 150–450)
POTASSIUM SERPL-SCNC: 4 MMOL/L (ref 3.5–5.3)
PROT SERPL-MCNC: 7.9 G/DL (ref 6.4–8.2)
PROT UR STRIP.AUTO-MCNC: NEGATIVE MG/DL
RBC # BLD AUTO: 4.46 X10*6/UL (ref 4–5.2)
RBC # UR STRIP.AUTO: NEGATIVE MG/DL
RBC #/AREA URNS AUTO: NORMAL /HPF
SODIUM SERPL-SCNC: 137 MMOL/L (ref 136–145)
SP GR UR STRIP.AUTO: 1.01
SQUAMOUS #/AREA URNS AUTO: NORMAL /HPF
UROBILINOGEN UR STRIP.AUTO-MCNC: NORMAL MG/DL
WBC # BLD AUTO: 6.9 X10*3/UL (ref 4.4–11.3)
WBC #/AREA URNS AUTO: NORMAL /HPF

## 2025-03-24 PROCEDURE — 71045 X-RAY EXAM CHEST 1 VIEW: CPT

## 2025-03-24 PROCEDURE — 36415 COLL VENOUS BLD VENIPUNCTURE: CPT | Performed by: NURSE PRACTITIONER

## 2025-03-24 PROCEDURE — 83880 ASSAY OF NATRIURETIC PEPTIDE: CPT | Performed by: NURSE PRACTITIONER

## 2025-03-24 PROCEDURE — 96361 HYDRATE IV INFUSION ADD-ON: CPT | Performed by: EMERGENCY MEDICINE

## 2025-03-24 PROCEDURE — 81001 URINALYSIS AUTO W/SCOPE: CPT | Performed by: NURSE PRACTITIONER

## 2025-03-24 PROCEDURE — 80053 COMPREHEN METABOLIC PANEL: CPT | Performed by: NURSE PRACTITIONER

## 2025-03-24 PROCEDURE — 71045 X-RAY EXAM CHEST 1 VIEW: CPT | Performed by: STUDENT IN AN ORGANIZED HEALTH CARE EDUCATION/TRAINING PROGRAM

## 2025-03-24 PROCEDURE — 84484 ASSAY OF TROPONIN QUANT: CPT | Performed by: NURSE PRACTITIONER

## 2025-03-24 PROCEDURE — 2500000004 HC RX 250 GENERAL PHARMACY W/ HCPCS (ALT 636 FOR OP/ED): Performed by: NURSE PRACTITIONER

## 2025-03-24 PROCEDURE — 99285 EMERGENCY DEPT VISIT HI MDM: CPT | Mod: 25 | Performed by: EMERGENCY MEDICINE

## 2025-03-24 PROCEDURE — 85025 COMPLETE CBC W/AUTO DIFF WBC: CPT | Performed by: NURSE PRACTITIONER

## 2025-03-24 PROCEDURE — 83735 ASSAY OF MAGNESIUM: CPT | Performed by: NURSE PRACTITIONER

## 2025-03-24 PROCEDURE — 87086 URINE CULTURE/COLONY COUNT: CPT | Mod: PORLAB | Performed by: NURSE PRACTITIONER

## 2025-03-24 PROCEDURE — 96360 HYDRATION IV INFUSION INIT: CPT | Performed by: EMERGENCY MEDICINE

## 2025-03-24 PROCEDURE — 93005 ELECTROCARDIOGRAM TRACING: CPT

## 2025-03-24 RX ORDER — LANOLIN ALCOHOL/MO/W.PET/CERES
400 CREAM (GRAM) TOPICAL DAILY
Status: DISCONTINUED | OUTPATIENT
Start: 2025-03-24 | End: 2025-03-24 | Stop reason: HOSPADM

## 2025-03-24 RX ADMIN — Medication 400 MG: at 16:58

## 2025-03-24 RX ADMIN — SODIUM CHLORIDE 1000 ML: 0.9 INJECTION, SOLUTION INTRAVENOUS at 16:19

## 2025-03-24 ASSESSMENT — PAIN SCALES - GENERAL: PAINLEVEL_OUTOF10: 0 - NO PAIN

## 2025-03-24 ASSESSMENT — LIFESTYLE VARIABLES
EVER FELT BAD OR GUILTY ABOUT YOUR DRINKING: NO
TOTAL SCORE: 0
HAVE YOU EVER FELT YOU SHOULD CUT DOWN ON YOUR DRINKING: NO
EVER HAD A DRINK FIRST THING IN THE MORNING TO STEADY YOUR NERVES TO GET RID OF A HANGOVER: NO
HAVE PEOPLE ANNOYED YOU BY CRITICIZING YOUR DRINKING: NO

## 2025-03-24 ASSESSMENT — PAIN - FUNCTIONAL ASSESSMENT: PAIN_FUNCTIONAL_ASSESSMENT: 0-10

## 2025-03-24 NOTE — ED PROVIDER NOTES
HPI   Chief Complaint   Patient presents with    intermittent left arm tingling/numb/dizziness/ resolved pre       69-year-old female with a past history of hypertension, orthostatic hypotension, palpitations, diabetes presents to the emergency department today for multiple complaints.  Patient states that she has had lightheadedness with position changes.  States has been ongoing for the past couple of months however significantly worsened over the past 2 days.  States she almost fell like she was going to pass out multiple times yesterday and throughout the day today.  Yesterday it seemed to be with position changes and today it was random.  She has had intermittent left arm numbness and tingling as well.  Worse over the past couple of days.  No weakness to the upper extremity.  Denies any chest pain or shortness of breath.  No abdominal or back pain.  No fever, chills, cough congestion.  Denies any neck pain or stiffness.  No lower extremity weakness.  She has no difficulty with her speech.                          Suzanne Coma Scale Score: 15                  Patient History   Past Medical History:   Diagnosis Date    Anxiety disorder, unspecified     Anxiety    Body mass index (BMI) 34.0-34.9, adult 10/16/2019    Body mass index (BMI) of 34.0 to 34.9 in adult    Body mass index (BMI) 38.0-38.9, adult 09/21/2021    Body mass index (BMI) of 38.0 to 38.9 in adult    Body mass index (BMI) 39.0-39.9, adult 01/13/2021    Body mass index (BMI) of 39.0 to 39.9 in adult    Cellulitis of unspecified part of limb 01/13/2021    Ankle cellulitis    COVID-19 01/13/2021    COVID-19    DM2 (diabetes mellitus, type 2) (Multi)     Hyperlipidemia     Hypertension     Major depressive disorder, single episode, in full remission (CMS-[facility]) 01/13/2021    Depression, major, in remission    Major depressive disorder, single episode, moderate (Multi) 07/22/2022    Moderate major depression    Personal history of other diseases of the  digestive system 2020    History of gastroesophageal reflux (GERD)    Personal history of other diseases of the musculoskeletal system and connective tissue     History of osteoarthritis    Personal history of other diseases of the nervous system and sense organs     History of migraine    Personal history of other endocrine, nutritional and metabolic disease     History of hypothyroidism    Personal history of other specified conditions     History of chest pain     Past Surgical History:   Procedure Laterality Date     SECTION, CLASSIC  2017     Section    CHOLECYSTECTOMY  2017    Cholecystectomy     Family History   Problem Relation Name Age of Onset    Cancer Father      Hypertension Father      Heart disease Father       Social History     Tobacco Use    Smoking status: Former     Current packs/day: 0.00     Types: Cigarettes     Quit date:      Years since quittin.2    Smokeless tobacco: Never   Vaping Use    Vaping status: Never Used   Substance Use Topics    Alcohol use: Never    Drug use: Never       Physical Exam   ED Triage Vitals [25 1400]   Temperature Heart Rate Respirations BP   36.8 °C (98.2 °F) 94 18 146/84      Pulse Ox Temp Source Heart Rate Source Patient Position   99 % Temporal Monitor Sitting      BP Location FiO2 (%)     Right arm --       Physical Exam    Labs Reviewed   MAGNESIUM - Abnormal       Result Value    Magnesium 1.57 (*)    COMPREHENSIVE METABOLIC PANEL - Abnormal    Glucose 87      Sodium 137      Potassium 4.0      Chloride 98      Bicarbonate 33 (*)     Anion Gap 10      Urea Nitrogen 9      Creatinine 1.07 (*)     eGFR 56 (*)     Calcium 10.3      Albumin 4.2      Alkaline Phosphatase 88      Total Protein 7.9      AST 19      Bilirubin, Total 0.5      ALT 17     B-TYPE NATRIURETIC PEPTIDE - Normal    BNP 59      Narrative:        <100 pg/mL - Heart failure unlikely  100-299 pg/mL - Intermediate probability of acute heart                   failure exacerbation. Correlate with clinical                  context and patient history.    >=300 pg/mL - Heart Failure likely. Correlate with clinical                  context and patient history.    BNP testing is performed using different testing methodology at [facility] than at other system hospitals. Direct result comparisons should only be made within the same method.      SERIAL TROPONIN-INITIAL - Normal    Troponin I, High Sensitivity 3      Narrative:     Less than 99th percentile of normal range cutoff-  Female and children under 18 years old <14 ng/L; Male <21 ng/L: Negative  Repeat testing should be performed if clinically indicated.     Female and children under 18 years old 14-50 ng/L; Male 21-50 ng/L:  Consistent with possible cardiac damage and possible increased clinical   risk. Serial measurements may help to assess extent of myocardial damage.     >50 ng/L: Consistent with cardiac damage, increased clinical risk and  myocardial infarction. Serial measurements may help assess extent of   myocardial damage.      NOTE: Children less than 1 year old may have higher baseline troponin   levels and results should be interpreted in conjunction with the overall   clinical context.     NOTE: Troponin I testing is performed using a different   testing methodology at [facility] than at other   [facility]. Direct result comparisons should only   be made within the same method.   CBC WITH AUTO DIFFERENTIAL    WBC 6.9      nRBC 0.0      RBC 4.46      Hemoglobin 13.2      Hematocrit 40.0      MCV 90      MCH 29.6      MCHC 33.0      RDW 14.0      Platelets 353      Neutrophils % 68.5      Immature Granulocytes %, Automated 0.3      Lymphocytes % 22.8      Monocytes % 6.4      Eosinophils % 1.0      Basophils % 1.0      Neutrophils Absolute 4.69      Immature Granulocytes Absolute, Automated 0.02      Lymphocytes Absolute 1.56      Monocytes Absolute 0.44       Eosinophils Absolute 0.07      Basophils Absolute 0.07     URINALYSIS WITH REFLEX CULTURE AND MICROSCOPIC    Narrative:     The following orders were created for panel order Urinalysis with Reflex Culture and Microscopic.  Procedure                               Abnormality         Status                     ---------                               -----------         ------                     Urinalysis with Reflex C...[[id number]]                      In process                 Extra Urine Gray Tube[[id number]]                            In process                   Please view results for these tests on the individual orders.   TROPONIN SERIES- (INITIAL, 1 HR)    Narrative:     The following orders were created for panel order Troponin I Series, High Sensitivity (0, 1 HR).  Procedure                               Abnormality         Status                     ---------                               -----------         ------                     Troponin I, High Sensiti...[[id number]]  Normal              Final result               Troponin, High Sensitivi...[[id number]]                      In process                   Please view results for these tests on the individual orders.   URINALYSIS WITH REFLEX CULTURE AND MICROSCOPIC   EXTRA URINE GRAY TUBE   SERIAL TROPONIN, 1 HOUR   MICROSCOPIC ONLY, URINE     Pain Management Panel           No data to display              XR chest 1 view   Final Result   No acute cardiopulmonary process.             MACRO:   None.        Signed by: Shashi Morales 3/24/2025 4:32 PM   Dictation workstation:   [id number]          ED Course & [facility]   ED Course as of 03/24/25 1817   Mon Mar 24, 2025   1615 Patient found to be Ortho positive.  She was lightheaded upon standing as well.  1 L normal saline ordered at this time.  Ejection fraction last year in 2024 of 60%. [RB]   1627 Magnesium of 1.57.  Replaced with 400 mg p.o. [RB]   1709 Reviewed patient cardiology note November 12,      History significant for elevated calcium score, hypertension, orthostatic hypotension, palpitations, type 2 diabetes.  Hyperlipidemia,    Calcium score test performed 2023 [WJ]   175 Denies current numbness.  Was orthostatic positive. [WJ]   175 Interval: Baseline  Time: 1752  Person Administering Scale: Julius Hatfield DO      1a  Level of consciousness: 0=alert; keenly responsive  1b. LOC questions:  0=Performs both tasks correctly  1c. LOC commands: 0=Performs both tasks correctly  2.  Best Gaze: 0=normal  3. Visual: 0=No visual loss  4. Facial Palsy: 0=Normal symmetric movement  5a. Motor left arm: 0=No drift, limb holds 90 (or 45) degrees for full 10 seconds  5b.  Motor right arm: 0=No drift, limb holds 90 (or 45) degrees for full 10 seconds  6a. motor left le=No drift, limb holds 90 (or 45) degrees for full 10 seconds  6b  Motor right le=No drift, limb holds 90 (or 45) degrees for full 10 seconds  7. Limb Ataxia: 0=Absent  8.  Sensory: 0=Normal; no sensory loss  9. Best Language:  0=No aphasia, normal  10. Dysarthria: 0=Normal  11. Extinction and Inattention: 0=No abnormality    Total:   0        VAN: VAN: Negative          [WJ]      ED Course User Index  [RB] MICHAEL Walker-CNP  [WJ] Julius Hatfield DO         Diagnoses as of 25   Orthostatic hypotension   Hypomagnesemia       Medical Decision Making  On initial evaluation patient is well-appearing in the emergency department at this time.  She is not currently having any lightheadedness or dizziness.  She has an NIH of 0.  The symptoms have been ongoing for the past few months.  I did review her cardiologist appointment that makes her marks on this as well as a family care note.  She had a CT of her head obtained outpatient in July.  EKG shows sinus rhythm at a rate of 88   QTc 442 no ST elevation or axis deviation.  Of note patient was seen by Dr. Poly chawla an November and she had reported  episodes of dizziness to her.  Dr. Pang that had ordered an echocardiogram and Holter monitoring.  Holter monitor demonstrated normal sinus rhythm.  ANABELL demonstrated normal left V EF 60% normal RV SF.  She also had a stress echo April 24 which is indeterminate due to baseline but no clinical or echocardiographic evidence of ischemia.  Labs were obtained in the emergency department.  She is found to have a low magnesium.  She was placed with 40 mg p.o.  She was found to be orthostatic positive and lightheaded upon standing.  1 L normal saline was obtained.  Patient had repeat Ortho's after 1 L and became asymptomatic and blood pressure remained stable.  Urine shows leukocytes but there is no white cells bacteria or nitrates limits low concern for infection.  Troponin of 3 with a repeat of 3 BNP of 59 CMP shows slightly elevated creat CBC shows leukocytosis anemia chest x-ray shows no acute cardiopulmonary process.  As this has been an ongoing problem for the past couple of months we do not feel as though patient needs an acute admission to the hospital for further evaluation and can follow-up outpatient basis however we did discuss strict return precautions with her she has no further questions or concerns she will be discharged home in stable condition.          Procedure  Procedures      Differential Diagnoses include orthostatic hypotension, intracranial abnormality, stroke, ACS  This is not an exhaustive list of all the diagnosis and therapeutics are considered during the patient's evaluation for an emergency medical condition.    I discussed the differential, results and discharge plan with the patient and/or family/friend/caregiver if present.  I emphasized the importance of follow-up with the physician I referred them to in the timeframe recommended.  I explained reasons for the patient to return to the Emergency Department. Additional verbal discharge instructions were also given and discussed with the  patient to supplement those generated by the EMR. We also discussed medications that were prescribed (if any) including common side effects and interactions. The patient was advised to abstain from driving, operating heavy machinery or making significant decisions while taking medications such as opiates and muscle relaxers that may impair this. All questions were addressed.  They understand return precautions and discharge instructions. The patient and/or family/friend/caregiver expressed understanding.           Nay Richardson, MICHAEL-WILLY  03/24/25 2497

## 2025-03-25 LAB
ATRIAL RATE: 88 BPM
HOLD SPECIMEN: NORMAL
P AXIS: 44 DEGREES
PR INTERVAL: 155 MS
Q ONSET: 249 MS
QRS COUNT: 14 BEATS
QRS DURATION: 100 MS
QT INTERVAL: 365 MS
QTC CALCULATION(BAZETT): 442 MS
QTC FREDERICIA: 414 MS
R AXIS: 18 DEGREES
T AXIS: 4 DEGREES
T OFFSET: 432 MS
VENTRICULAR RATE: 88 BPM

## 2025-03-25 PROCEDURE — RXMED WILLOW AMBULATORY MEDICATION CHARGE

## 2025-03-26 ENCOUNTER — PHARMACY VISIT (OUTPATIENT)
Dept: PHARMACY | Facility: CLINIC | Age: 70
End: 2025-03-26
Payer: MEDICARE

## 2025-03-26 LAB — BACTERIA UR CULT: NORMAL

## 2025-04-01 DIAGNOSIS — F32.A DEPRESSION, UNSPECIFIED DEPRESSION TYPE: ICD-10-CM

## 2025-04-01 RX ORDER — AMITRIPTYLINE HYDROCHLORIDE 10 MG/1
30 TABLET, FILM COATED ORAL NIGHTLY
Qty: 270 TABLET | Refills: 3 | Status: SHIPPED | OUTPATIENT
Start: 2025-04-01 | End: 2026-03-27

## 2025-04-01 NOTE — TELEPHONE ENCOUNTER
refill  Received: Elizabet Barajas [id number] [facility] Clinical Support Staff  Phone Number: [phone]     Hi. Would you please fax script for Amitripylin 10mg 3 taba at bedtime    Thanks Nelly.

## 2025-04-20 ENCOUNTER — PATIENT MESSAGE (OUTPATIENT)
Dept: PRIMARY CARE | Facility: CLINIC | Age: 70
End: 2025-04-20
Payer: MEDICARE

## 2025-04-20 DIAGNOSIS — E11.9 TYPE 2 DIABETES MELLITUS WITHOUT COMPLICATION, WITHOUT LONG-TERM CURRENT USE OF INSULIN: ICD-10-CM

## 2025-04-20 RX ORDER — TIRZEPATIDE 7.5 MG/.5ML
7.5 INJECTION, SOLUTION SUBCUTANEOUS WEEKLY
Qty: 2 ML | Refills: 2 | Status: SHIPPED | OUTPATIENT
Start: 2025-04-20

## 2025-04-21 ENCOUNTER — PHARMACY VISIT (OUTPATIENT)
Dept: PHARMACY | Facility: CLINIC | Age: 70
End: 2025-04-21
Payer: MEDICARE

## 2025-04-21 PROCEDURE — RXMED WILLOW AMBULATORY MEDICATION CHARGE

## 2025-04-22 DIAGNOSIS — E11.9 TYPE 2 DIABETES MELLITUS WITHOUT COMPLICATION, WITHOUT LONG-TERM CURRENT USE OF INSULIN: Primary | ICD-10-CM

## 2025-04-29 DIAGNOSIS — E78.5 HYPERLIPIDEMIA ASSOCIATED WITH TYPE 2 DIABETES MELLITUS: ICD-10-CM

## 2025-04-29 DIAGNOSIS — R11.0 NAUSEA: ICD-10-CM

## 2025-04-29 DIAGNOSIS — I10 BENIGN ESSENTIAL HYPERTENSION: ICD-10-CM

## 2025-04-29 DIAGNOSIS — E11.69 HYPERLIPIDEMIA ASSOCIATED WITH TYPE 2 DIABETES MELLITUS: ICD-10-CM

## 2025-04-29 DIAGNOSIS — F41.9 ANXIETY: ICD-10-CM

## 2025-04-29 RX ORDER — BUSPIRONE HYDROCHLORIDE 7.5 MG/1
7.5 TABLET ORAL AS NEEDED
Qty: 30 TABLET | Refills: 2 | Status: SHIPPED | OUTPATIENT
Start: 2025-04-29

## 2025-04-29 RX ORDER — ATORVASTATIN CALCIUM 40 MG/1
60 TABLET, FILM COATED ORAL NIGHTLY
Qty: 135 TABLET | Refills: 3 | Status: SHIPPED | OUTPATIENT
Start: 2025-04-29 | End: 2026-04-29

## 2025-04-29 RX ORDER — ONDANSETRON 4 MG/1
4 TABLET, FILM COATED ORAL EVERY 8 HOURS PRN
Qty: 20 TABLET | Refills: 0 | Status: SHIPPED | OUTPATIENT
Start: 2025-04-29

## 2025-04-29 RX ORDER — HYDROCHLOROTHIAZIDE 12.5 MG/1
12.5 TABLET ORAL DAILY
Qty: 90 TABLET | Refills: 3 | Status: SHIPPED | OUTPATIENT
Start: 2025-04-29 | End: 2026-04-29

## 2025-04-29 NOTE — TELEPHONE ENCOUNTER
Hi. Need refills please  Ondanaetron 4mg prn N/V  Buspirone 7.5 mg Prn anxiety  Atorvastatin 40mg 1&1/2 q hs  Hydrochlorothazide 12.5mg one daily        Thanks. Emily

## 2025-05-05 ENCOUNTER — APPOINTMENT (OUTPATIENT)
Dept: PHARMACY | Facility: HOSPITAL | Age: 70
End: 2025-05-05
Payer: MEDICARE

## 2025-05-05 DIAGNOSIS — E11.9 TYPE 2 DIABETES MELLITUS WITHOUT COMPLICATION, WITHOUT LONG-TERM CURRENT USE OF INSULIN: ICD-10-CM

## 2025-05-05 NOTE — PROGRESS NOTES
I reviewed the progress note and agree with the resident’s findings and plans as written. Case discussed with resident.    Kirt Do, PharmD

## 2025-05-05 NOTE — PROGRESS NOTES
"Pharmacy Clinic Note    Elizabet Butler is a 69 y.o. female was referred to Clinical Pharmacy Team for diabetes management.     Referring Provider: Nelly Lucas APRN-C*    Subjective   Allergies[1]    [facility] - [place] ([place] - [address]  [address]  [place] [address]  Phone: [phone] Fax: [phone]     [facility]  [address] [facility] [address]  Phone: [phone] Fax: [phone]      Diabetes  Patient reports for her initial clinical pharmacy appointment. She has T2DM and risk factors of HLD, HTN, and obesity. Patient's most recent A1c was 5.8% (2024). Her current diabetic medication therapy includes metformin 750 mg daily and Mounjaro 7.5 mg weekly.     Objective     LMP  (LMP Unknown)      LAB  Lab Results   Component Value Date    BILITOT 0.5 2025    CALCIUM 10.3 2025    CO2 33 (H) 2025    CL 98 2025    CREATININE 1.07 (H) 2025    GLUCOSE 87 2025    ALKPHOS 88 2025    K 4.0 2025    PROT 7.9 2025     2025    AST 19 2025    ALT 17 2025    BUN 9 2025    ANIONGAP 10 2025    MG 1.57 (L) 2025    ALBUMIN 4.2 2025    GFRF 66 2023     Lab Results   Component Value Date    TRIG 99 2024    CHOL 136 2024    LDLCALC 55 2024    HDL 61.6 2024     Lab Results   Component Value Date    HGBA1C 5.8 (H) 2024     Estimated body mass index is 32.89 kg/m² as calculated from the following:    Height as of 3/24/25: 1.702 m (5' 7\").    Weight as of 3/24/25: 95.3 kg (210 lb).    Medications Ordered Prior to Encounter[2]     DRUG INTERACTIONS  - No significant drug-drug interactions exist that require change in therapy  _______________________________________________________________________  PATIENT EDUCATION/GOALS    1. Discussed disease specific goals:   - Fasting B - 130 mg/dL  - Postprandial BG: less than 180 " mg/dL  - A1c: less than 7%    2. Patient's diabetes is well controlled and reports daily home blood sugars in the 90's.    3. Patient reports to be paying $200+ for her Mounjaro. Encouraged the patient to submit her most recent 1040 forms to [facility] to possibly be enrolled in the  patient assistance program.    4. Counseled patient on MOA, expectations, side effects, duration of therapy, contraindication, administration, and monitoring parameters.      5. Answered all patient questions and concerns to the best of my ability.  _______________________________________________________________________     Patient Assistance Program (PAP)    Application for program to be submitted for the following medications: Mounjaro    Prescription Insurance:  Yes   Paid Test Claim:  Other: Pending   County of Permanent Address:  [facility]   Members of Household:  1   Files Taxes:  Yes     Patient will be dropping of financial paperwork at [facility].     Patient verbally reports monthly or yearly income which is less than 400% federal poverty level    Patient aware this process may take up to 6 weeks.     If approved medication must be filled through [facility] and MEDICATION WILL BE MAILED TO PATIENT.    Assessment/Plan   Problem List Items Addressed This Visit       Type 2 diabetes mellitus without complication, without long-term current use of insulin    Continue Metformin 750 mg daily  Continue Mounjatro 7.5 mg subcutaneous weekly         Relevant Orders    Referral to Clinical Pharmacy      Continue all meds under the continuation of care with the referring provider and clinical pharmacy team.    Clinical Pharmacist follow-up: 6/23/2025    Austin Tompkins PharmD     Verbal consent to manage patient's drug therapy was obtained from [the patient and/or an individual authorized to act on behalf of a patient]. They were informed they may decline to participate or withdraw from  participation in pharmacy services at any time.        [1] No Known Allergies  [2]   Current Outpatient Medications on File Prior to Visit   Medication Sig Dispense Refill    amitriptyline (Elavil) 10 mg tablet Take 3 tablets (30 mg) by mouth once daily at bedtime. 270 tablet 3    aspirin 81 mg chewable tablet Chew 1 tablet (81 mg) once daily.      atorvastatin (Lipitor) 40 mg tablet Take 1.5 tablets (60 mg) by mouth once daily at bedtime. 135 tablet 3    blood sugar diagnostic strip 1 strip once daily. 100 strip 11    blood-glucose meter misc Check blood sugars once daily 1 each 0    buPROPion XL (Wellbutrin XL) 300 mg 24 hr tablet Take 1 tablet (300 mg) by mouth once daily. 90 tablet 3    busPIRone (Buspar) 7.5 mg tablet Take 1 tablet (7.5 mg) by mouth if needed (for anxiety). 30 tablet 2    carvedilol (Coreg) 12.5 mg tablet Take 1 tablet (12.5 mg) by mouth 2 times a day with meals. 180 tablet 3    cholecalciferol (Vitamin D3) 5,000 Units tablet Take 1 tablet (5,000 Units) by mouth once daily.      hydroCHLOROthiazide (Microzide) 12.5 mg tablet Take 1 tablet (12.5 mg) by mouth once daily. 90 tablet 3    lancets misc Check blood sugars daily 100 each 11    levothyroxine (Synthroid, Levoxyl) 137 mcg tablet Take 1 tablet (137 mcg) by mouth once daily. 90 tablet 3    losartan (Cozaar) 100 mg tablet Take 1 tablet (100 mg) by mouth once daily. 90 tablet 3    metFORMIN XR (Glucophage-XR) 750 mg 24 hr tablet Take 1 tablet (750 mg) by mouth once daily. as directed 90 tablet 3    omeprazole (PriLOSEC) 40 mg DR capsule Take 1 capsule (40 mg) by mouth once daily. 90 capsule 1    ondansetron (Zofran) 4 mg tablet Take 1 tablet (4 mg) by mouth every 8 hours if needed for nausea or vomiting. 20 tablet 0    OneTouch Delica Plus Lancet 30 gauge misc USE 1 TO CHECK GLUCOSE ONCE DAILY      OneTouch Ultra Test strip USE 1 STRIP TO CHECK GLUCOSE TWICE DAILY      tirzepatide (Mounjaro) 7.5 mg/0.5 mL pen injector Inject 7.5 mg under the  skin 1 (one) time per week. 2 mL 2    [DISCONTINUED] atorvastatin (Lipitor) 40 mg tablet Take 1.5 tablets (60 mg) by mouth once daily at bedtime. 135 tablet 3    [DISCONTINUED] busPIRone (Buspar) 7.5 mg tablet TAKE 1 TABLET BY MOUTH AS NEEDED FOR ANXIETY 30 tablet 2    [DISCONTINUED] hydroCHLOROthiazide (Microzide) 12.5 mg tablet Take 1 tablet (12.5 mg) by mouth once daily. 90 tablet 3    [DISCONTINUED] ondansetron (Zofran) 4 mg tablet Take 1 tablet (4 mg) by mouth every 8 hours if needed.       Current Facility-Administered Medications on File Prior to Visit   Medication Dose Route Frequency Provider Last Rate Last Admin    cyanocobalamin (Vitamin B-12) injection 1,000 mcg  1,000 mcg intramuscular Once MICHAEL Carrillo-CNS

## 2025-05-06 ENCOUNTER — TELEPHONE (OUTPATIENT)
Dept: PRIMARY CARE | Facility: CLINIC | Age: 70
End: 2025-05-06
Payer: MEDICARE

## 2025-05-06 DIAGNOSIS — I10 BENIGN ESSENTIAL HYPERTENSION: ICD-10-CM

## 2025-05-07 ENCOUNTER — APPOINTMENT (OUTPATIENT)
Dept: CARDIOLOGY | Facility: CLINIC | Age: 70
End: 2025-05-07
Payer: MEDICARE

## 2025-05-08 ENCOUNTER — APPOINTMENT (OUTPATIENT)
Dept: PRIMARY CARE | Facility: CLINIC | Age: 70
End: 2025-05-08
Payer: MEDICARE

## 2025-05-08 VITALS
HEIGHT: 67 IN | BODY MASS INDEX: 33.12 KG/M2 | DIASTOLIC BLOOD PRESSURE: 60 MMHG | WEIGHT: 211 LBS | HEART RATE: 90 BPM | SYSTOLIC BLOOD PRESSURE: 102 MMHG

## 2025-05-08 DIAGNOSIS — Z00.00 MEDICARE ANNUAL WELLNESS VISIT, SUBSEQUENT: Primary | ICD-10-CM

## 2025-05-08 DIAGNOSIS — E53.8 VITAMIN B12 DEFICIENCY: ICD-10-CM

## 2025-05-08 DIAGNOSIS — I10 BENIGN ESSENTIAL HYPERTENSION: ICD-10-CM

## 2025-05-08 DIAGNOSIS — Z86.39 HISTORY OF HYPOTHYROIDISM: ICD-10-CM

## 2025-05-08 DIAGNOSIS — E11.69 HYPERLIPIDEMIA ASSOCIATED WITH TYPE 2 DIABETES MELLITUS: ICD-10-CM

## 2025-05-08 DIAGNOSIS — E78.5 HYPERLIPIDEMIA ASSOCIATED WITH TYPE 2 DIABETES MELLITUS: ICD-10-CM

## 2025-05-08 DIAGNOSIS — Z12.31 VISIT FOR SCREENING MAMMOGRAM: ICD-10-CM

## 2025-05-08 DIAGNOSIS — F32.0 MILD MAJOR DEPRESSION (CMS-HCC): ICD-10-CM

## 2025-05-08 DIAGNOSIS — I73.9 PVD (PERIPHERAL VASCULAR DISEASE) (CMS-HCC): ICD-10-CM

## 2025-05-08 DIAGNOSIS — K21.9 GASTROESOPHAGEAL REFLUX DISEASE, UNSPECIFIED WHETHER ESOPHAGITIS PRESENT: ICD-10-CM

## 2025-05-08 DIAGNOSIS — E11.9 TYPE 2 DIABETES MELLITUS WITHOUT COMPLICATION, WITHOUT LONG-TERM CURRENT USE OF INSULIN: ICD-10-CM

## 2025-05-08 DIAGNOSIS — F41.9 ANXIETY: ICD-10-CM

## 2025-05-08 DIAGNOSIS — E03.9 HYPOTHYROIDISM IN ADULT: ICD-10-CM

## 2025-05-08 PROCEDURE — 1160F RVW MEDS BY RX/DR IN RCRD: CPT | Performed by: CLINICAL NURSE SPECIALIST

## 2025-05-08 PROCEDURE — 1123F ACP DISCUSS/DSCN MKR DOCD: CPT | Performed by: CLINICAL NURSE SPECIALIST

## 2025-05-08 PROCEDURE — 1159F MED LIST DOCD IN RCRD: CPT | Performed by: CLINICAL NURSE SPECIALIST

## 2025-05-08 PROCEDURE — 99214 OFFICE O/P EST MOD 30 MIN: CPT | Performed by: CLINICAL NURSE SPECIALIST

## 2025-05-08 PROCEDURE — G0444 DEPRESSION SCREEN ANNUAL: HCPCS | Performed by: CLINICAL NURSE SPECIALIST

## 2025-05-08 PROCEDURE — 3078F DIAST BP <80 MM HG: CPT | Performed by: CLINICAL NURSE SPECIALIST

## 2025-05-08 PROCEDURE — 1036F TOBACCO NON-USER: CPT | Performed by: CLINICAL NURSE SPECIALIST

## 2025-05-08 PROCEDURE — 1170F FXNL STATUS ASSESSED: CPT | Performed by: CLINICAL NURSE SPECIALIST

## 2025-05-08 PROCEDURE — 3008F BODY MASS INDEX DOCD: CPT | Performed by: CLINICAL NURSE SPECIALIST

## 2025-05-08 PROCEDURE — 3074F SYST BP LT 130 MM HG: CPT | Performed by: CLINICAL NURSE SPECIALIST

## 2025-05-08 PROCEDURE — G0439 PPPS, SUBSEQ VISIT: HCPCS | Performed by: CLINICAL NURSE SPECIALIST

## 2025-05-08 PROCEDURE — 1158F ADVNC CARE PLAN TLK DOCD: CPT | Performed by: CLINICAL NURSE SPECIALIST

## 2025-05-08 RX ORDER — OMEPRAZOLE 40 MG/1
40 CAPSULE, DELAYED RELEASE ORAL DAILY
Qty: 90 CAPSULE | Refills: 3 | Status: SHIPPED | OUTPATIENT
Start: 2025-05-08 | End: 2026-05-03

## 2025-05-08 ASSESSMENT — ENCOUNTER SYMPTOMS
LOSS OF SENSATION IN FEET: 0
EYE PAIN: 0
VOMITING: 0
UNEXPECTED WEIGHT CHANGE: 0
DIZZINESS: 0
DEPRESSION: 1
HEADACHES: 0
DIARRHEA: 0
FEVER: 0
CHILLS: 0
TROUBLE SWALLOWING: 0
WHEEZING: 0
CONSTIPATION: 0
WOUND: 0
SEIZURES: 0
BRUISES/BLEEDS EASILY: 0
POLYDIPSIA: 0
CHEST TIGHTNESS: 0
DYSURIA: 0
NAUSEA: 0
BLOOD IN STOOL: 0
JOINT SWELLING: 0
OCCASIONAL FEELINGS OF UNSTEADINESS: 0
ARTHRALGIAS: 0
ABDOMINAL PAIN: 0
SORE THROAT: 0
ACTIVITY CHANGE: 0
HEMATURIA: 0
FLANK PAIN: 0
COUGH: 0
SHORTNESS OF BREATH: 0
BACK PAIN: 0
SLEEP DISTURBANCE: 0
CONFUSION: 0
PALPITATIONS: 0
FATIGUE: 0
APPETITE CHANGE: 0
NECK PAIN: 0
MYALGIAS: 0
PHOTOPHOBIA: 0

## 2025-05-08 ASSESSMENT — COLUMBIA-SUICIDE SEVERITY RATING SCALE - C-SSRS
1. IN THE PAST MONTH, HAVE YOU WISHED YOU WERE DEAD OR WISHED YOU COULD GO TO SLEEP AND NOT WAKE UP?: NO
6. HAVE YOU EVER DONE ANYTHING, STARTED TO DO ANYTHING, OR PREPARED TO DO ANYTHING TO END YOUR LIFE?: NO
2. HAVE YOU ACTUALLY HAD ANY THOUGHTS OF KILLING YOURSELF?: NO

## 2025-05-08 ASSESSMENT — ACTIVITIES OF DAILY LIVING (ADL)
MANAGING_FINANCES: INDEPENDENT
GROCERY_SHOPPING: INDEPENDENT
TAKING_MEDICATION: INDEPENDENT
BATHING: INDEPENDENT
DRESSING: INDEPENDENT
DOING_HOUSEWORK: INDEPENDENT

## 2025-05-08 ASSESSMENT — PATIENT HEALTH QUESTIONNAIRE - PHQ9
2. FEELING DOWN, DEPRESSED OR HOPELESS: SEVERAL DAYS
1. LITTLE INTEREST OR PLEASURE IN DOING THINGS: NOT AT ALL
SUM OF ALL RESPONSES TO PHQ9 QUESTIONS 1 AND 2: 1

## 2025-05-08 NOTE — PROGRESS NOTES
Subjective   Reason for Visit: Elizabet Butler is an 69 y.o. female here for a Medicare Wellness visit.     Past Medical, Surgical, and Family History reviewed and updated in chart.    Reviewed all medications by prescribing practitioner or clinical pharmacist (such as prescriptions, OTCs, herbal therapies and supplements) and documented in the medical record.    HPI    Patient presents in the office today as a follow up appointment. Due for Medicare Wellness.      History of Migraine variant. Headaches with symptoms.       Thyroid had been uncontrolled. Improved with restarting the oral medication.      The patient states she has been doing well with her Type II Diabetes control since the last visit. Insurance did not cover Mounjaro but was able to restart. Did not tolerate Ozempic. Was tolerating Trulicity better but benefited better from Mounjaro. Restarted Mounjaro and tolerating well. Now following with Pharmacy.      Comorbid Illnesses: hypertension, hyperlipidemia and obesity.   Disease Course and Complications:. there have been no previous episodes of diabetic ketoacidosis. there have been no previous hospitalizations. She has no significant interval events.      Followed with [facility]. Following with Cardiology.      Has continued on Amitriptyline and Wellbutrin. Buspar PRN.     Patient Care Team:  PIETRO Carrillo as PCP - General (Internal Medicine)  PIETRO Carrillo as PCP - [employer] Advantage PCP  Elaina Pang MD as Consulting Physician (Cardiology)  Praveen Marcus MD as Consulting Physician (Cardiology)  Raphael Franklin DO as Surgeon (Gastroenterology)     Review of Systems   Constitutional:  Negative for activity change, appetite change, chills, fatigue, fever and unexpected weight change.   HENT:  Negative for ear pain, hearing loss, nosebleeds, sore throat, tinnitus and trouble swallowing.    Eyes:  Negative for photophobia, pain and visual disturbance.   Respiratory:   "Negative for cough, chest tightness, shortness of breath and wheezing.    Cardiovascular:  Negative for chest pain, palpitations and leg swelling.   Gastrointestinal:  Negative for abdominal pain, blood in stool, constipation, diarrhea, nausea and vomiting.   Endocrine: Negative for cold intolerance, heat intolerance, polydipsia and polyuria.   Genitourinary:  Negative for dysuria, flank pain and hematuria.   Musculoskeletal:  Negative for arthralgias, back pain, joint swelling, myalgias and neck pain.   Skin:  Negative for pallor, rash and wound.   Allergic/Immunologic: Negative for immunocompromised state.   Neurological:  Negative for dizziness, seizures and headaches.   Hematological:  Does not bruise/bleed easily.   Psychiatric/Behavioral:  Negative for confusion and sleep disturbance.        Objective   Vitals:  /60 (BP Location: Right arm)   Pulse 90   Ht 1.702 m (5' 7\")   Wt 95.7 kg (211 lb)   LMP  (LMP Unknown)   BMI 33.05 kg/m²       Physical Exam  Vitals and nursing note reviewed.   Constitutional:       General: She is not in acute distress.     Appearance: Normal appearance.   HENT:      Head: Normocephalic.      Nose: Nose normal.   Eyes:      Conjunctiva/sclera: Conjunctivae normal.   Neck:      Vascular: No carotid bruit.   Cardiovascular:      Rate and Rhythm: Normal rate and regular rhythm.      Pulses: Normal pulses.      Heart sounds: Normal heart sounds.   Pulmonary:      Effort: Pulmonary effort is normal.      Breath sounds: Normal breath sounds.   Abdominal:      General: Bowel sounds are normal.      Palpations: Abdomen is soft.   Musculoskeletal:         General: Normal range of motion.      Cervical back: Normal range of motion.   Skin:     General: Skin is warm and dry.   Neurological:      Mental Status: She is alert and oriented to person, place, and time. Mental status is at baseline.   Psychiatric:         Mood and Affect: Mood normal.         Behavior: Behavior normal. "         Assessment & Plan  Benign essential hypertension    Orders:    Follow Up In [facility] - PCP - Medicare Annual    Follow Up In [facility]; Future    Type 2 diabetes mellitus without complication, without long-term current use of insulin    Orders:    Follow Up In [facility] - PCP - Medicare Annual    Follow Up In [facility]; Future    Hyperlipidemia associated with type 2 diabetes mellitus    Orders:    Follow Up In [facility] - PCP - Medicare Annual    Follow Up In [facility]; Future    Mild major depression (CMS-HCC)    Orders:    Follow Up In [facility] - PCP - Medicare Annual    Follow Up In [facility]; Future    PVD (peripheral vascular disease) (CMS-HCC)    Orders:    Follow Up In [facility] - PCP - Medicare Annual    Follow Up In [facility]; Future    Hypothyroidism in adult    Orders:    Follow Up In [facility] - PCP - Medicare Annual    Follow Up In [facility]; Future    Vitamin B12 deficiency    Orders:    Follow Up In [facility] - PCP - Medicare Annual    Follow Up In [facility]; Future    Gastroesophageal reflux disease, unspecified whether esophagitis present    Orders:    Follow Up In [facility] - PCP - Medicare Annual    omeprazole (PriLOSEC) 40 mg DR capsule; Take 1 capsule (40 mg) by mouth once daily.    Follow Up In [facility]; Future    Anxiety    Orders:    Follow Up In [facility] - PCP - Medicare Annual    Follow Up In [facility]; Future    History of hypothyroidism    Orders:    Follow Up In [facility] - PCP - Medicare Annual    Follow Up In [facility]; Future    Medicare  annual wellness visit, subsequent    Orders:    Follow Up In [facility] - PCP - Established; Future    Visit for screening mammogram    Orders:    BI mammo bilateral screening tomosynthesis; Future             Has an updated order for blood work to have completed.      Hypothyroidism. Continue Levothyroxine dosage. Reevaluate with next lab work.   Abnormal Calcium score. She had normal stress test in the summer of 2017. She has been seen by cardiology and is now on a statin, baby aspirin a beta blocker. Yearly follow-up with cardiology.   PVD: Stable at this time. continue to monitor. Vascular referral placed by Cardiology. Planning to follow back with NP.   Hypertension. Blood pressures normally well controlled. Will continue current medication. Patient previously discontinued ARB secondary to dizziness. Now lowered Carvedilol due to dizziness.   Insomnia, Anxiety, Depression, History of migraine variant. Doing well continue carvedilol and amitriptyline. Increased Amitriptyline with increased Depression. Continue Wellbutrin, discussed adjusting dosage as she is not sure that she still needs. Buspirone. Reevaluate at follow up appointment.   Diabetes mellitus type 2: A1C 5.8%. Did not tolerate Ozempic. Initially continue Metformin, monitor blood sugars to discontinue. Continue to monitor hemoglobin A1c. Urine Albumin: April 2024. ARB discontinued by patient as above secondary to dizziness. No diabetic complications. Restarted Mounjaro.   Mild dyslipidemia. Continue atorvastatin 40 mg daily. Repeat lipid panel yearly is ordered per cardiology.  Obesity: BMI: 33.05. Lifestyle changes as noted above.  Vitamin B12 Deficiency: Adjust Vitamin B12.   Medicare Wellness: Routine and age appropriate recommendations discussed with the patient today and patient verbalized understanding of the recommendations.  Questions answered.  Age appropriate immunizations and preventative screenings discussed with the patient  and ordered as appropriate. Labs updated and ordered as indicated. Recommend healthy diet and daily exercise to maintain healthy body weight. 5 minutes  were spent screening for depression using PHQ2/PHQ9 as documented in the chart.      DEXA scan May 2024, normal.   Normal mammogram May 2024.   Colonoscopy: December 2023, plan to repeat in 5 years.   Prevnar 20: August 2023.  Pneumovax: September 2019.   Flu Vaccine: November 2024.   Shingrix: October 2020, March 2021.   Medicare Wellness: May 2025.   Discussed Tdap.

## 2025-05-09 LAB
25(OH)D3+25(OH)D2 SERPL-MCNC: 84 NG/ML (ref 30–100)
ALBUMIN SERPL-MCNC: 4.1 G/DL (ref 3.6–5.1)
ALBUMIN/CREAT UR: 4 MG/G CREAT
ALP SERPL-CCNC: 76 U/L (ref 37–153)
ALT SERPL-CCNC: 12 U/L (ref 6–29)
ANION GAP SERPL CALCULATED.4IONS-SCNC: 10 MMOL/L (CALC) (ref 7–17)
AST SERPL-CCNC: 14 U/L (ref 10–35)
BILIRUB SERPL-MCNC: 0.5 MG/DL (ref 0.2–1.2)
BUN SERPL-MCNC: 15 MG/DL (ref 7–25)
CALCIUM SERPL-MCNC: 9.1 MG/DL (ref 8.6–10.4)
CHLORIDE SERPL-SCNC: 99 MMOL/L (ref 98–110)
CHOLEST SERPL-MCNC: 129 MG/DL
CHOLEST/HDLC SERPL: 2 (CALC)
CO2 SERPL-SCNC: 29 MMOL/L (ref 20–32)
CREAT SERPL-MCNC: 1.08 MG/DL (ref 0.6–1)
CREAT UR-MCNC: 68 MG/DL (ref 20–275)
EGFRCR SERPLBLD CKD-EPI 2021: 55 ML/MIN/1.73M2
ERYTHROCYTE [DISTWIDTH] IN BLOOD BY AUTOMATED COUNT: 12.7 % (ref 11–15)
EST. AVERAGE GLUCOSE BLD GHB EST-MCNC: 114 MG/DL
EST. AVERAGE GLUCOSE BLD GHB EST-SCNC: 6.3 MMOL/L
GLUCOSE SERPL-MCNC: 82 MG/DL (ref 65–99)
HBA1C MFR BLD: 5.6 %
HCT VFR BLD AUTO: 38.4 % (ref 35–45)
HDLC SERPL-MCNC: 63 MG/DL
HGB BLD-MCNC: 12.4 G/DL (ref 11.7–15.5)
LDLC SERPL CALC-MCNC: 48 MG/DL (CALC)
MCH RBC QN AUTO: 29.5 PG (ref 27–33)
MCHC RBC AUTO-ENTMCNC: 32.3 G/DL (ref 32–36)
MCV RBC AUTO: 91.4 FL (ref 80–100)
MICROALBUMIN UR-MCNC: 0.3 MG/DL
NONHDLC SERPL-MCNC: 66 MG/DL (CALC)
PLATELET # BLD AUTO: 367 THOUSAND/UL (ref 140–400)
PMV BLD REES-ECKER: 10.5 FL (ref 7.5–12.5)
POTASSIUM SERPL-SCNC: 3.5 MMOL/L (ref 3.5–5.3)
PROT SERPL-MCNC: 6.9 G/DL (ref 6.1–8.1)
RBC # BLD AUTO: 4.2 MILLION/UL (ref 3.8–5.1)
SODIUM SERPL-SCNC: 138 MMOL/L (ref 135–146)
T4 FREE SERPL-MCNC: 2 NG/DL (ref 0.8–1.8)
TRIGL SERPL-MCNC: 98 MG/DL
TSH SERPL-ACNC: 0.36 MIU/L (ref 0.4–4.5)
VIT B12 SERPL-MCNC: 1156 PG/ML (ref 200–1100)
WBC # BLD AUTO: 7.4 THOUSAND/UL (ref 3.8–10.8)

## 2025-05-12 ENCOUNTER — TELEPHONE (OUTPATIENT)
Dept: PRIMARY CARE | Facility: CLINIC | Age: 70
End: 2025-05-12
Payer: MEDICARE

## 2025-05-12 ENCOUNTER — PATIENT MESSAGE (OUTPATIENT)
Dept: PRIMARY CARE | Facility: CLINIC | Age: 70
End: 2025-05-12
Payer: MEDICARE

## 2025-05-12 DIAGNOSIS — E03.9 HYPOTHYROIDISM IN ADULT: Primary | ICD-10-CM

## 2025-05-12 DIAGNOSIS — R19.5 POSITIVE COLORECTAL CANCER SCREENING USING COLOGUARD TEST: ICD-10-CM

## 2025-05-12 DIAGNOSIS — E53.8 VITAMIN B 12 DEFICIENCY: ICD-10-CM

## 2025-05-12 DIAGNOSIS — Z86.39 HISTORY OF HYPOTHYROIDISM: ICD-10-CM

## 2025-05-12 DIAGNOSIS — K21.9 GASTROESOPHAGEAL REFLUX DISEASE, UNSPECIFIED WHETHER ESOPHAGITIS PRESENT: ICD-10-CM

## 2025-05-12 DIAGNOSIS — E53.8 VITAMIN B12 DEFICIENCY: ICD-10-CM

## 2025-05-12 DIAGNOSIS — E11.69 HYPERLIPIDEMIA ASSOCIATED WITH TYPE 2 DIABETES MELLITUS: ICD-10-CM

## 2025-05-12 DIAGNOSIS — E55.9 VITAMIN D DEFICIENCY: ICD-10-CM

## 2025-05-12 DIAGNOSIS — E78.5 HYPERLIPIDEMIA ASSOCIATED WITH TYPE 2 DIABETES MELLITUS: ICD-10-CM

## 2025-05-12 DIAGNOSIS — E11.9 TYPE 2 DIABETES MELLITUS WITHOUT COMPLICATION, WITHOUT LONG-TERM CURRENT USE OF INSULIN: ICD-10-CM

## 2025-05-12 DIAGNOSIS — I73.9 PVD (PERIPHERAL VASCULAR DISEASE): ICD-10-CM

## 2025-05-12 DIAGNOSIS — E03.9 HYPOTHYROIDISM IN ADULT: ICD-10-CM

## 2025-05-12 RX ORDER — LEVOTHYROXINE SODIUM 125 UG/1
125 TABLET ORAL DAILY
Qty: 30 TABLET | Refills: 11 | Status: SHIPPED | OUTPATIENT
Start: 2025-05-12 | End: 2026-05-12

## 2025-05-12 NOTE — TELEPHONE ENCOUNTER
----- Message from Nelly Lucas sent at 5/11/2025  9:26 PM EDT -----  Updated patient via Portal. Please reorder lab work to be completed prior to follow up appointment. Thank you!   ----- Message -----  From: Smeet Results In  Sent: 5/9/2025   4:20 AM EDT  To: MICHAEL Carrillo-CNS

## 2025-05-13 ENCOUNTER — OFFICE VISIT (OUTPATIENT)
Dept: CARDIOLOGY | Facility: HOSPITAL | Age: 70
End: 2025-05-13
Payer: MEDICARE

## 2025-05-13 VITALS
DIASTOLIC BLOOD PRESSURE: 62 MMHG | SYSTOLIC BLOOD PRESSURE: 114 MMHG | WEIGHT: 210 LBS | HEART RATE: 86 BPM | OXYGEN SATURATION: 96 % | HEIGHT: 67 IN | BODY MASS INDEX: 32.96 KG/M2

## 2025-05-13 DIAGNOSIS — I10 BENIGN ESSENTIAL HYPERTENSION: ICD-10-CM

## 2025-05-13 DIAGNOSIS — R42 DIZZINESS AND GIDDINESS: ICD-10-CM

## 2025-05-13 DIAGNOSIS — E11.69 HYPERLIPIDEMIA ASSOCIATED WITH TYPE 2 DIABETES MELLITUS: ICD-10-CM

## 2025-05-13 DIAGNOSIS — R00.2 PALPITATIONS: Primary | ICD-10-CM

## 2025-05-13 DIAGNOSIS — E78.5 HYPERLIPIDEMIA ASSOCIATED WITH TYPE 2 DIABETES MELLITUS: ICD-10-CM

## 2025-05-13 LAB
ATRIAL RATE: 87 BPM
P AXIS: 51 DEGREES
P OFFSET: 190 MS
P ONSET: 139 MS
PR INTERVAL: 158 MS
Q ONSET: 218 MS
QRS COUNT: 14 BEATS
QRS DURATION: 76 MS
QT INTERVAL: 388 MS
QTC CALCULATION(BAZETT): 466 MS
QTC FREDERICIA: 439 MS
R AXIS: 14 DEGREES
T AXIS: 37 DEGREES
T OFFSET: 412 MS
VENTRICULAR RATE: 87 BPM

## 2025-05-13 PROCEDURE — 4010F ACE/ARB THERAPY RXD/TAKEN: CPT | Performed by: INTERNAL MEDICINE

## 2025-05-13 PROCEDURE — 93005 ELECTROCARDIOGRAM TRACING: CPT | Performed by: INTERNAL MEDICINE

## 2025-05-13 PROCEDURE — 99214 OFFICE O/P EST MOD 30 MIN: CPT | Mod: 25 | Performed by: INTERNAL MEDICINE

## 2025-05-13 PROCEDURE — 3008F BODY MASS INDEX DOCD: CPT | Performed by: INTERNAL MEDICINE

## 2025-05-13 PROCEDURE — 3078F DIAST BP <80 MM HG: CPT | Performed by: INTERNAL MEDICINE

## 2025-05-13 PROCEDURE — 1036F TOBACCO NON-USER: CPT | Performed by: INTERNAL MEDICINE

## 2025-05-13 PROCEDURE — 99214 OFFICE O/P EST MOD 30 MIN: CPT | Performed by: INTERNAL MEDICINE

## 2025-05-13 PROCEDURE — 3074F SYST BP LT 130 MM HG: CPT | Performed by: INTERNAL MEDICINE

## 2025-05-13 PROCEDURE — 1159F MED LIST DOCD IN RCRD: CPT | Performed by: INTERNAL MEDICINE

## 2025-05-13 NOTE — PROGRESS NOTES
Chief Complaint:   No chief complaint on file.     History Of Present Illness:    Elizabet Butler is a 69 y.o. female presenting for 6-month follow up.  H/o elevated CAC score (total score 1136.4), HTN, Orthostatic hypotension, Palpitations, and DM2.   Due to elevated CAC score, she did have a stress echo in 4/2024 - EKG was indeterminate due to baseline abnl, but there was no clinical or echocardiographic evidence of ischemia.    Last seen by me in 11/2024. At a previous virtual visit, she reported episodes of dizziness - I ordered an echocardiogram and holter monitor.  Holter demonstrated NSR, avg HR 83bpm.  TTE demonstrated normal LVEF 60%, normal RVSF. Last visit, she had no cardiac complaints but had neck stiffness/pain - she was referred to urgent care. She was also referred to vascular medicine for varicose veins.  Today, she reports neck pain improved after an injection.     ROS:  The remainder of the review of systems was obtained, as was negative as pertains to the chief complaint.    CV testing and labs reviewed:    Labs 5/2025:  K 3.5  BUN 15  Cr 1.08  GFR 55  ALT 12  AST 14   Lipid labs 5/2025:    HDL 63  LDL 48  TG 98    9/2024  TTE   EF 60% trace tricuspid regurg,     Monitor worn from 9/6-10/05/2024  Predominant rhythm was SR.  Max heart rate was 120  min 65  av 83, SVE/VE <1%. Three symptoms were lightheaded these correlated with ST, SR, ST with artifact.     Monitor worn from 4/2-14/2024  min heart rate 63  max 152  av 81.  Predominant SR.  One run SVT 16 beats max rate of 152.  SVE's/Ve's < 1%.  Poor tracing quality makes identification of p wave difficult, particularly during SVT.     Stress test 4/2023  Summary:  1. Indeterminate due to baseline ST segment abnormality.  2. Indeterminate Stress Test.  3. No clinical evidence for ischemia at maximal infusion.  4. No ECG changes from baseline.  5. The adequate level of stress was achieved.    CT CAC score 3/2023  LM  0,   LAD  848   Lcx  32.2   RCA  256.2  total 1136.4    Last Recorded Vitals:  There were no vitals filed for this visit.    Past Medical History:  She has a past medical history of Anxiety disorder, unspecified, Body mass index (BMI) 34.0-34.9, adult (10/16/2019), Body mass index (BMI) 38.0-38.9, adult (2021), Body mass index (BMI) 39.0-39.9, adult (2021), Cellulitis of unspecified part of limb (2021), COVID-19 (2021), DM2 (diabetes mellitus, type 2) (Multi), Hyperlipidemia, Hypertension, Major depressive disorder, single episode, in full remission (CMS-[facility]) (2021), Major depressive disorder, single episode, moderate (Multi) (2022), Personal history of other diseases of the digestive system (2020), Personal history of other diseases of the musculoskeletal system and connective tissue, Personal history of other diseases of the nervous system and sense organs, Personal history of other endocrine, nutritional and metabolic disease, and Personal history of other specified conditions.    Past Surgical History:  She has a past surgical history that includes  section, classic (2017) and Cholecystectomy (2017).      Social History:  She reports that she quit smoking about 13 years ago. Her smoking use included cigarettes. She has never used smokeless tobacco. She reports that she does not drink alcohol and does not use drugs.    Family History:  Family History   Problem Relation Name Age of Onset    Cancer Father      Hypertension Father      Heart disease Father          Allergies:  Patient has no known allergies.    Outpatient Medications:  Current Outpatient Medications   Medication Instructions    amitriptyline (ELAVIL) 30 mg, oral, Nightly    aspirin 81 mg chewable tablet 1 tablet, Daily    atorvastatin (LIPITOR) 60 mg, oral, Nightly    blood sugar diagnostic strip 1 strip, miscellaneous, Daily    blood-glucose meter misc Check blood sugars once daily    buPROPion XL (WELLBUTRIN XL)  300 mg, oral, Daily    busPIRone (BUSPAR) 7.5 mg, oral, As needed    carvedilol (COREG) 12.5 mg, oral, 2 times daily (morning and late afternoon)    cholecalciferol (VITAMIN D3) 5,000 Units, Daily    hydroCHLOROthiazide (MICROZIDE) 12.5 mg, oral, Daily    lancets misc Check blood sugars daily    levothyroxine (SYNTHROID, LEVOXYL) 125 mcg, oral, Daily, Take on an empty stomach at the same time each day, either 30 to 60 minutes prior to breakfast    losartan (COZAAR) 100 mg, oral, Daily    metFORMIN XR (GLUCOPHAGE-XR) 750 mg, oral, Daily, as directed    Mounjaro 7.5 mg, subcutaneous, Weekly    omeprazole (PRILOSEC) 40 mg, oral, Daily    ondansetron (ZOFRAN) 4 mg, oral, Every 8 hours PRN    OneTouch Delica Plus Lancet 30 gauge misc USE 1 TO CHECK GLUCOSE ONCE DAILY    OneTouch Ultra Test strip USE 1 STRIP TO CHECK GLUCOSE TWICE DAILY       Physical Exam:  Physical Exam  HENT:      Head: Normocephalic.      Nose: Nose normal.      Mouth/Throat:      Mouth: Mucous membranes are moist.   Pulmonary:      Effort: Pulmonary effort is normal.   Abdominal:      Palpations: Abdomen is soft.   Musculoskeletal:         General: Normal range of motion.      Cervical back: Normal range of motion.   Skin:     General: Skin is warm and dry.   Neurological:      Mental Status: She is alert.   Psychiatric:         Mood and Affect: Mood normal.       Last Labs:  CBC -  Lab Results   Component Value Date    WBC 7.4 05/08/2025    HGB 12.4 05/08/2025    HCT 38.4 05/08/2025    MCV 91.4 05/08/2025     05/08/2025       CMP -  Lab Results   Component Value Date    CALCIUM 9.1 05/08/2025    PROT 6.9 05/08/2025    ALBUMIN 4.1 05/08/2025    AST 14 05/08/2025    ALT 12 05/08/2025    ALKPHOS 76 05/08/2025    BILITOT 0.5 05/08/2025       LIPID PANEL -   Lab Results   Component Value Date    CHOL 129 05/08/2025    TRIG 98 05/08/2025    HDL 63 05/08/2025    CHHDL 2.0 05/08/2025    LDLF 62 06/30/2023    VLDL 20 11/04/2024    NHDL 66 05/08/2025        RENAL FUNCTION PANEL -   Lab Results   Component Value Date    GLUCOSE 82 05/08/2025     05/08/2025    K 3.5 05/08/2025    CL 99 05/08/2025    CO2 29 05/08/2025    ANIONGAP 10 05/08/2025    BUN 15 05/08/2025    CREATININE 1.08 (H) 05/08/2025    CALCIUM 9.1 05/08/2025    ALBUMIN 4.1 05/08/2025        Lab Results   Component Value Date    BNP 59 03/24/2025    HGBA1C 5.6 05/08/2025     Assessment/Plan   Elevated CAC score > 1000  Presyncope  HTN  Varicose veins   Dizziness     No angina but c/o dizziness without presyncope/syncope.   States she sometimes gets dizzy with standing, but also with just moving her head.  Having stiff/painful neck - difficult to move neck due to pain.     Plan:  -referred to neuro for autonomic testing  -stop hydrochlorothiazide 12.5mg daily  -advised to keep BP log off diuretic  -stay on lower dose carvedilol 12.5mg, half tab bid and losartan 100mg daily  -follow up in 3-4 weeks for RN/MD BP check - will do orthostatics next visit  -advised to bring in BP cuff next visit to check it for accuracy  -FU in 3 weeks    Scribe Attestation  By signing my name below, I, Alicia Triana, Scribe   attest that this documentation has been prepared under the direction and in the presence of Elaina Pang MD.

## 2025-05-13 NOTE — PATIENT INSTRUCTIONS
Thanks for following up in office today.    1)  I referred you to a neurologist for autonomic testing to check on your dizziness.     2)  Stop hydrochlorothiazide. Continue monitoring your blood pressure at home. Please bring your blood pressure cuff next visit in 3 weeks.    3)  Please continue your cardiac medications as prescribed.    Follow up with Dr. Pang in 3 weeks  If you have any questions, please call us at [phone]

## 2025-05-21 ENCOUNTER — HOSPITAL ENCOUNTER (OUTPATIENT)
Dept: RADIOLOGY | Facility: CLINIC | Age: 70
Discharge: HOME | End: 2025-05-21
Payer: MEDICARE

## 2025-05-21 ENCOUNTER — PHARMACY VISIT (OUTPATIENT)
Dept: PHARMACY | Facility: CLINIC | Age: 70
End: 2025-05-21
Payer: MEDICARE

## 2025-05-21 VITALS — BODY MASS INDEX: 32.96 KG/M2 | HEIGHT: 67 IN | WEIGHT: 210 LBS

## 2025-05-21 DIAGNOSIS — Z12.31 VISIT FOR SCREENING MAMMOGRAM: ICD-10-CM

## 2025-05-21 PROCEDURE — 77067 SCR MAMMO BI INCL CAD: CPT

## 2025-05-21 PROCEDURE — RXMED WILLOW AMBULATORY MEDICATION CHARGE

## 2025-05-21 PROCEDURE — 77067 SCR MAMMO BI INCL CAD: CPT | Performed by: RADIOLOGY

## 2025-05-21 PROCEDURE — 77063 BREAST TOMOSYNTHESIS BI: CPT | Performed by: RADIOLOGY

## 2025-05-27 DIAGNOSIS — I10 BENIGN ESSENTIAL HYPERTENSION: ICD-10-CM

## 2025-05-27 RX ORDER — LOSARTAN POTASSIUM 100 MG/1
100 TABLET ORAL DAILY
Qty: 90 TABLET | Refills: 3 | Status: SHIPPED | OUTPATIENT
Start: 2025-05-27 | End: 2026-05-27

## 2025-05-27 NOTE — TELEPHONE ENCOUNTER
Med Refill   losartan (Cozaar) 100 mg tablet [[id number]]       [id number] - [place] - [address]  [address] , [place]) [place] [address]  Phone: [phone]  Fax: [phone]  YUSRA #: [id number]

## 2025-06-13 DIAGNOSIS — E11.9 TYPE 2 DIABETES MELLITUS WITHOUT COMPLICATION, WITHOUT LONG-TERM CURRENT USE OF INSULIN: ICD-10-CM

## 2025-06-13 NOTE — TELEPHONE ENCOUNTER
Elizabet Butler to P [id number] Clinical Support Staff (supporting Nelly Lucas, MICHAEL-CNS) (Selected Message)        6/12/25  9:02 PM  Peterson Villegas.  Would you please order test strips? Didn't realize my supply was depleted.  Thank you     Emily

## 2025-06-18 PROCEDURE — RXMED WILLOW AMBULATORY MEDICATION CHARGE

## 2025-06-19 ENCOUNTER — OFFICE VISIT (OUTPATIENT)
Dept: CARDIOLOGY | Facility: HOSPITAL | Age: 70
End: 2025-06-19
Payer: MEDICARE

## 2025-06-19 ENCOUNTER — PHARMACY VISIT (OUTPATIENT)
Dept: PHARMACY | Facility: CLINIC | Age: 70
End: 2025-06-19
Payer: MEDICARE

## 2025-06-19 VITALS
BODY MASS INDEX: 32.96 KG/M2 | HEIGHT: 67 IN | HEART RATE: 83 BPM | OXYGEN SATURATION: 96 % | WEIGHT: 210 LBS | SYSTOLIC BLOOD PRESSURE: 122 MMHG | DIASTOLIC BLOOD PRESSURE: 70 MMHG

## 2025-06-19 DIAGNOSIS — R60.9 EDEMA, UNSPECIFIED TYPE: ICD-10-CM

## 2025-06-19 DIAGNOSIS — I25.10 ATHEROSCLEROSIS OF NATIVE CORONARY ARTERY OF NATIVE HEART WITHOUT ANGINA PECTORIS: ICD-10-CM

## 2025-06-19 DIAGNOSIS — I10 BENIGN ESSENTIAL HYPERTENSION: ICD-10-CM

## 2025-06-19 DIAGNOSIS — R93.1 ABNORMAL SCREENING CARDIAC CT: Primary | ICD-10-CM

## 2025-06-19 PROCEDURE — 99214 OFFICE O/P EST MOD 30 MIN: CPT | Performed by: INTERNAL MEDICINE

## 2025-06-19 PROCEDURE — 3008F BODY MASS INDEX DOCD: CPT | Performed by: INTERNAL MEDICINE

## 2025-06-19 PROCEDURE — 3078F DIAST BP <80 MM HG: CPT | Performed by: INTERNAL MEDICINE

## 2025-06-19 PROCEDURE — 1159F MED LIST DOCD IN RCRD: CPT | Performed by: INTERNAL MEDICINE

## 2025-06-19 PROCEDURE — 3074F SYST BP LT 130 MM HG: CPT | Performed by: INTERNAL MEDICINE

## 2025-06-19 PROCEDURE — 4010F ACE/ARB THERAPY RXD/TAKEN: CPT | Performed by: INTERNAL MEDICINE

## 2025-06-19 PROCEDURE — 99213 OFFICE O/P EST LOW 20 MIN: CPT | Performed by: INTERNAL MEDICINE

## 2025-06-19 NOTE — PROGRESS NOTES
Chief Complaint:   No chief complaint on file.     History Of Present Illness:    Elizabet Butler is a 69 y.o. female presenting for 3-week follow up, BP check.  H/o elevated CAC score (total score 1136.4), HTN, Orthostatic hypotension, Palpitations, and DM2.   Due to elevated CAC score, she did have a stress echo in 4/2024 - EKG was indeterminate due to baseline abnl, but there was no clinical or echocardiographic evidence of ischemia.    Last seen by me in 5/2025. At a previous virtual visit, she reported episodes of dizziness. Holter demonstrated NSR, avg HR 83bpm.  TTE demonstrated normal LVEF 60%, normal RVSF. Last visit, she mentioned neck pain had improved after an injection. I referred her to a neurologist for autonomic testing for her dizziness. I stopped her hydrochlorothiazide; I advised to monitor her BP at home, bring her cuff, and we will do orthostatics today.    Today, Pt presents with home BP readings after stopping hydrochlorothiazide:  over 60s-80s. She c/o leg pain a/w swelling since stopping water pill and has since restarted it - above BP's are on the diuretic. She wonders if she can take it as needed. Pt states her appt is on 10/30/25 for autonomic testing.     ROS:  The remainder of the review of systems was obtained, as was negative as pertains to the chief complaint.    CV testing and labs reviewed:    Labs 5/2025:  K 3.5  BUN 15  Cr 1.08  GFR 55  ALT 12  AST 14   Lipid labs 5/2025:    HDL 63  LDL 48  TG 98    9/2024  TTE   EF 60% trace tricuspid regurg,     Monitor worn from 9/6-10/05/2024  Predominant rhythm was SR.  Max heart rate was 120  min 65  av 83, SVE/VE <1%. Three symptoms were lightheaded these correlated with ST, SR, ST with artifact.     Monitor worn from 4/2-14/2024  min heart rate 63  max 152  av 81.  Predominant SR.  One run SVT 16 beats max rate of 152.  SVE's/Ve's < 1%.  Poor tracing quality makes identification of p wave difficult, particularly during SVT.      Stress test 2023  Summary:  1. Indeterminate due to baseline ST segment abnormality.  2. Indeterminate Stress Test.  3. No clinical evidence for ischemia at maximal infusion.  4. No ECG changes from baseline.  5. The adequate level of stress was achieved.    CT CAC score 3/2023  LM  0,   LAD  848   Lcx  32.2  RCA  256.2  total 1136.4    Last Recorded Vitals:  There were no vitals filed for this visit.    Past Medical History:  She has a past medical history of Anxiety disorder, unspecified, Body mass index (BMI) 34.0-34.9, adult (10/16/2019), Body mass index (BMI) 38.0-38.9, adult (2021), Body mass index (BMI) 39.0-39.9, adult (2021), Cellulitis of unspecified part of limb (2021), COVID-19 (2021), DM2 (diabetes mellitus, type 2) (Multi), Hyperlipidemia, Hypertension, Major depressive disorder, single episode, in full remission (2021), Major depressive disorder, single episode, moderate (Multi) (2022), Personal history of other diseases of the digestive system (2020), Personal history of other diseases of the musculoskeletal system and connective tissue, Personal history of other diseases of the nervous system and sense organs, Personal history of other endocrine, nutritional and metabolic disease, and Personal history of other specified conditions.    Past Surgical History:  She has a past surgical history that includes  section, classic (2017) and Cholecystectomy (2017).      Social History:  She reports that she quit smoking about 13 years ago. Her smoking use included cigarettes. She has never used smokeless tobacco. She reports that she does not drink alcohol and does not use drugs.    Family History:  Family History   Problem Relation Name Age of Onset    Cancer Father      Hypertension Father      Heart disease Father          Allergies:  Patient has no known allergies.    Outpatient Medications:  Current Outpatient Medications   Medication  Instructions    amitriptyline (ELAVIL) 30 mg, oral, Nightly    aspirin 81 mg chewable tablet 1 tablet, Daily    atorvastatin (LIPITOR) 60 mg, oral, Nightly    blood sugar diagnostic 1 strip, miscellaneous, Daily    blood-glucose meter misc Check blood sugars once daily    buPROPion XL (WELLBUTRIN XL) 300 mg, oral, Daily    busPIRone (BUSPAR) 7.5 mg, oral, As needed    carvedilol (COREG) 12.5 mg, oral, 2 times daily (morning and late afternoon)    cholecalciferol (VITAMIN D3) 5,000 Units, Daily    hydroCHLOROthiazide (MICROZIDE) 12.5 mg, oral, Daily    lancets misc Check blood sugars daily    levothyroxine (SYNTHROID, LEVOXYL) 125 mcg, oral, Daily, Take on an empty stomach at the same time each day, either 30 to 60 minutes prior to breakfast    losartan (COZAAR) 100 mg, oral, Daily    metFORMIN XR (GLUCOPHAGE-XR) 750 mg, oral, Daily, as directed    Mounjaro 7.5 mg, subcutaneous, Weekly    omeprazole (PRILOSEC) 40 mg, oral, Daily    ondansetron (ZOFRAN) 4 mg, oral, Every 8 hours PRN    OneTouch Delica Plus Lancet 30 gauge misc USE 1 TO CHECK GLUCOSE ONCE DAILY    OneTouch Ultra Test strip USE 1 STRIP TO CHECK GLUCOSE TWICE DAILY       Physical Exam:  Physical Exam  HENT:      Head: Normocephalic.      Nose: Nose normal.      Mouth/Throat:      Mouth: Mucous membranes are moist.   Neck:      Vascular: No carotid bruit.   Cardiovascular:      Rate and Rhythm: Normal rate and regular rhythm.      Heart sounds: Murmur heard.      Comments: 1/6 systolic murmur heard throughout precordium  Pulmonary:      Effort: Pulmonary effort is normal.   Abdominal:      Palpations: Abdomen is soft.   Musculoskeletal:         General: Normal range of motion.      Cervical back: Normal range of motion.      Right lower leg: No edema.      Left lower leg: No edema.   Skin:     General: Skin is warm and dry.   Neurological:      Mental Status: She is alert.   Psychiatric:         Mood and Affect: Mood normal.       Last Labs:  CBC -  Lab  Results   Component Value Date    WBC 7.4 05/08/2025    HGB 12.4 05/08/2025    HCT 38.4 05/08/2025    MCV 91.4 05/08/2025     05/08/2025       CMP -  Lab Results   Component Value Date    CALCIUM 9.1 05/08/2025    PROT 6.9 05/08/2025    ALBUMIN 4.1 05/08/2025    AST 14 05/08/2025    ALT 12 05/08/2025    ALKPHOS 76 05/08/2025    BILITOT 0.5 05/08/2025       LIPID PANEL -   Lab Results   Component Value Date    CHOL 129 05/08/2025    TRIG 98 05/08/2025    HDL 63 05/08/2025    CHHDL 2.0 05/08/2025    LDLF 62 06/30/2023    VLDL 20 11/04/2024    NHDL 66 05/08/2025       RENAL FUNCTION PANEL -   Lab Results   Component Value Date    GLUCOSE 82 05/08/2025     05/08/2025    K 3.5 05/08/2025    CL 99 05/08/2025    CO2 29 05/08/2025    ANIONGAP 10 05/08/2025    BUN 15 05/08/2025    CREATININE 1.08 (H) 05/08/2025    CALCIUM 9.1 05/08/2025    ALBUMIN 4.1 05/08/2025        Lab Results   Component Value Date    BNP 59 03/24/2025    HGBA1C 5.6 05/08/2025     Assessment/Plan   Elevated CAC score > 1000  Presyncope  HTN  Varicose veins   Dizziness     No angina but c/o dizziness without presyncope/syncope. States she sometimes gets dizzy with standing, but also with just moving her head.  Had stiff/painful neck, difficult to move neck due to pain - improved after an injection.   Last visit, we stopped hydrochlorothiazide 12.5mg daily but Pt had worsening swelling. BP log off diuretic and is now back on it - reviewed:  over 60s-80s. BP today 122/70.       Plan:  -last visit, referred to neuro for autonomic testing - Pt states her appt is on 10/30/25  -continue lower dose carvedilol 12.5mg half tab bid   -continue losartan 100mg daily  -restart taking hydrochlorothiazide 12.5mg as needed based on leg swelling  -avoid every day diuretic- follow BP/HR's  -advised leg compression to help limit edema    Scribe Attestation  By signing my name below, I, Alicia Triana, Scribe   attest that this documentation has been  prepared under the direction and in the presence of Elaina Pang MD.

## 2025-06-19 NOTE — PATIENT INSTRUCTIONS
Thanks for following up in office today.    1)  Please restart taking hydrochlorothiazide as needed based on your leg swelling.     2)  I also encourage you to try compression stockings and leg elevation to help your leg swelling.    3)  Please continue your cardiac medications as prescribed.    Follow up with Dr. Pang in 3 months  If you have any questions, please call us at [phone]

## 2025-06-22 DIAGNOSIS — E03.9 HYPOTHYROIDISM IN ADULT: ICD-10-CM

## 2025-06-23 ENCOUNTER — APPOINTMENT (OUTPATIENT)
Dept: PHARMACY | Facility: HOSPITAL | Age: 70
End: 2025-06-23
Payer: MEDICARE

## 2025-06-23 DIAGNOSIS — E11.9 TYPE 2 DIABETES MELLITUS WITHOUT COMPLICATION, WITHOUT LONG-TERM CURRENT USE OF INSULIN: ICD-10-CM

## 2025-06-23 NOTE — PROGRESS NOTES
"Pharmacy Clinic Note    Elizabet Butler is a 69 y.o. female was referred to Clinical Pharmacy Team for diabetes management.     Referring Provider: Nelly Lucas APRN-C*    Subjective   Allergies[1]    [facility] - [place] - [address]  [address]  [place] [address]  Phone: [phone] Fax: [phone]     [facility]  [address] [facility] [address]  Phone: [phone] Fax: [phone]    [facility]  [address], [address]  [facility] [address]  Phone: [phone] Fax: [phone]      Diabetes  Patient reports for her follow-up clinical pharmacy appointment. She has T2DM and risk factors of HLD, HTN, and obesity. Patient's most recent A1c was 5.6% (5/8/2025). Her current diabetic medication therapy includes metformin 750 mg daily and Mounjaro 7.5 mg weekly.     Objective     LMP  (LMP Unknown)      LAB  Lab Results   Component Value Date    BILITOT 0.5 05/08/2025    CALCIUM 9.1 05/08/2025    CO2 29 05/08/2025    CL 99 05/08/2025    CREATININE 1.08 (H) 05/08/2025    GLUCOSE 82 05/08/2025    ALKPHOS 76 05/08/2025    K 3.5 05/08/2025    PROT 6.9 05/08/2025     05/08/2025    AST 14 05/08/2025    ALT 12 05/08/2025    BUN 15 05/08/2025    ANIONGAP 10 05/08/2025    MG 1.57 (L) 03/24/2025    ALBUMIN 4.1 05/08/2025    GFRF 66 06/30/2023     Lab Results   Component Value Date    TRIG 98 05/08/2025    CHOL 129 05/08/2025    LDLCALC 48 05/08/2025    HDL 63 05/08/2025     Lab Results   Component Value Date    HGBA1C 5.6 05/08/2025     Estimated body mass index is 32.89 kg/m² as calculated from the following:    Height as of 6/19/25: 1.702 m (5' 7\").    Weight as of 6/19/25: 95.3 kg (210 lb).    Medications Ordered Prior to Encounter[2]     DRUG INTERACTIONS  - No significant drug-drug interactions exist that require change in therapy  _______________________________________________________________________  PATIENT " EDUCATION/GOALS    1. Discussed disease specific goals:   - Fasting B - 130 mg/dL  - Postprandial BG: less than 180 mg/dL  - A1c: less than 7%    2. Patient’s diabetes remains well controlled. She reports checking her blood glucose levels in the morning, with readings consistently in the 90s.    3. Patient recently picked up her Mounjaro from [facility] and plans to contact [facility] to schedule her next refill.    4. At the next visit, consider discussing a possible dose reduction of Mounjaro due to consistently in-range blood sugars and A1c.    5. Counseled patient on MOA, expectations, side effects, duration of therapy, contraindication, administration, and monitoring parameters.      6. Answered all patient questions and concerns to the best of my ability.  _______________________________________________________________________     Patient Assistance Program Approval:     We are pleased to inform you that your application for assistance has been approved.     This approval is valid through 2026 as long as the following criteria continue to be satisfied:     Your medication (Mounjaro) remains covered under your current insurance plan.   Your prescriber does not discontinue therapy.   You do not seek reimbursement from any other private or government-funded programs for the  medication.    Under this program, the pharmacy will first bill your insurance plan for your indemnified specified medication. The [employer] Fund will then offset your copay balance, so that your out-of pocket expense for your specialty medication will be $0.00.    Assessment/Plan   Problem List Items Addressed This Visit       Type 2 diabetes mellitus without complication, without long-term current use of insulin    Continue  Metformin 750 mg daily  Mounjaro 7.5 mg subcutaneous weekly  Testing home blood sugars         Relevant Orders    Referral to Clinical Pharmacy      Continue all meds under the continuation  of care with the referring provider and clinical pharmacy team.    Clinical Pharmacist follow-up: 8/25/2025    Austin Tompkins PharmD     Verbal consent to manage patient's drug therapy was obtained from [the patient and/or an individual authorized to act on behalf of a patient]. They were informed they may decline to participate or withdraw from participation in pharmacy services at any time.         [1] No Known Allergies  [2]   Current Outpatient Medications on File Prior to Visit   Medication Sig Dispense Refill    amitriptyline (Elavil) 10 mg tablet Take 3 tablets (30 mg) by mouth once daily at bedtime. 270 tablet 3    aspirin 81 mg chewable tablet Chew 1 tablet (81 mg) once daily.      atorvastatin (Lipitor) 40 mg tablet Take 1.5 tablets (60 mg) by mouth once daily at bedtime. 135 tablet 3    blood sugar diagnostic 1 strip once daily. 100 strip 11    blood-glucose meter misc Check blood sugars once daily 1 each 0    buPROPion XL (Wellbutrin XL) 300 mg 24 hr tablet Take 1 tablet (300 mg) by mouth once daily. 90 tablet 3    busPIRone (Buspar) 7.5 mg tablet Take 1 tablet (7.5 mg) by mouth if needed (for anxiety). 30 tablet 2    carvedilol (Coreg) 12.5 mg tablet Take 1 tablet (12.5 mg) by mouth 2 times a day with meals. 180 tablet 3    cholecalciferol (Vitamin D3) 5,000 Units tablet Take 1 tablet (5,000 Units) by mouth once daily.      hydroCHLOROthiazide (Microzide) 12.5 mg tablet Take 1 tablet (12.5 mg) by mouth once daily. 90 tablet 3    lancets misc Check blood sugars daily 100 each 11    levothyroxine (Synthroid, Levoxyl) 125 mcg tablet Take 1 tablet (125 mcg) by mouth early in the morning.. Take on an empty stomach at the same time each day, either 30 to 60 minutes prior to breakfast 30 tablet 11    losartan (Cozaar) 100 mg tablet Take 1 tablet (100 mg) by mouth once daily. 90 tablet 3    metFORMIN XR (Glucophage-XR) 750 mg 24 hr tablet Take 1 tablet (750 mg) by mouth once daily. as directed 90 tablet 3     omeprazole (PriLOSEC) 40 mg DR capsule Take 1 capsule (40 mg) by mouth once daily. 90 capsule 3    ondansetron (Zofran) 4 mg tablet Take 1 tablet (4 mg) by mouth every 8 hours if needed for nausea or vomiting. 20 tablet 0    OneTouch Delica Plus Lancet 30 gauge misc USE 1 TO CHECK GLUCOSE ONCE DAILY      OneTouch Ultra Test strip USE 1 STRIP TO CHECK GLUCOSE TWICE DAILY      tirzepatide (Mounjaro) 7.5 mg/0.5 mL pen injector Inject 7.5 mg under the skin 1 (one) time per week. 2 mL 2     Current Facility-Administered Medications on File Prior to Visit   Medication Dose Route Frequency Provider Last Rate Last Admin    cyanocobalamin (Vitamin B-12) injection 1,000 mcg  1,000 mcg intramuscular Once Nelly Lcuas, MICHAEL-CNS

## 2025-07-09 PROCEDURE — RXMED WILLOW AMBULATORY MEDICATION CHARGE

## 2025-07-14 ENCOUNTER — PHARMACY VISIT (OUTPATIENT)
Dept: PHARMACY | Facility: CLINIC | Age: 70
End: 2025-07-14
Payer: MEDICARE

## 2025-08-01 DIAGNOSIS — R93.1 ABNORMAL SCREENING CARDIAC CT: ICD-10-CM

## 2025-08-01 DIAGNOSIS — E78.5 HYPERLIPIDEMIA ASSOCIATED WITH TYPE 2 DIABETES MELLITUS: ICD-10-CM

## 2025-08-01 DIAGNOSIS — E11.69 HYPERLIPIDEMIA ASSOCIATED WITH TYPE 2 DIABETES MELLITUS: ICD-10-CM

## 2025-08-01 DIAGNOSIS — I10 BENIGN ESSENTIAL HYPERTENSION: ICD-10-CM

## 2025-08-01 DIAGNOSIS — I25.10 ATHEROSCLEROSIS OF NATIVE CORONARY ARTERY OF NATIVE HEART WITHOUT ANGINA PECTORIS: ICD-10-CM

## 2025-08-01 RX ORDER — CARVEDILOL 12.5 MG/1
12.5 TABLET ORAL
Qty: 180 TABLET | Refills: 3 | Status: SHIPPED | OUTPATIENT
Start: 2025-08-01 | End: 2026-08-01

## 2025-08-01 NOTE — TELEPHONE ENCOUNTER
Carvedilol 12.5mg  (Newest Message First)             Elizabet RUIZ Do [id number] Clinical Support Staff (supporting MICHAEL Carrillo-CNS) (Selected Message)        7/31/25 11:13 PM  Hi. Please Need refill. [facility]  Carvedilol 12.5    1/2 tab(6.25mg) 2x q day with meal  Thanks! I appreciate it.  Emily

## 2025-08-10 ENCOUNTER — PATIENT MESSAGE (OUTPATIENT)
Dept: PRIMARY CARE | Facility: CLINIC | Age: 70
End: 2025-08-10
Payer: MEDICARE

## 2025-08-10 DIAGNOSIS — E11.9 TYPE 2 DIABETES MELLITUS WITHOUT COMPLICATION, WITHOUT LONG-TERM CURRENT USE OF INSULIN: Primary | ICD-10-CM

## 2025-08-11 RX ORDER — TIRZEPATIDE 10 MG/.5ML
10 INJECTION, SOLUTION SUBCUTANEOUS WEEKLY
Qty: 2 ML | Refills: 2 | Status: SHIPPED | OUTPATIENT
Start: 2025-08-11

## 2025-08-11 RX ORDER — TIRZEPATIDE 10 MG/.5ML
10 INJECTION, SOLUTION SUBCUTANEOUS WEEKLY
Qty: 2 ML | Refills: 2 | Status: SHIPPED | OUTPATIENT
Start: 2025-08-11 | End: 2025-08-11 | Stop reason: SDUPTHER

## 2025-08-12 PROCEDURE — RXMED WILLOW AMBULATORY MEDICATION CHARGE

## 2025-08-13 ENCOUNTER — PHARMACY VISIT (OUTPATIENT)
Dept: PHARMACY | Facility: CLINIC | Age: 70
End: 2025-08-13
Payer: MEDICARE

## 2025-08-21 ENCOUNTER — OFFICE VISIT (OUTPATIENT)
Dept: VASCULAR SURGERY | Facility: CLINIC | Age: 70
End: 2025-08-21
Payer: MEDICARE

## 2025-08-21 VITALS
WEIGHT: 207 LBS | DIASTOLIC BLOOD PRESSURE: 64 MMHG | OXYGEN SATURATION: 100 % | BODY MASS INDEX: 32.49 KG/M2 | SYSTOLIC BLOOD PRESSURE: 100 MMHG | HEIGHT: 67 IN | HEART RATE: 77 BPM

## 2025-08-21 DIAGNOSIS — I83.813 VARICOSE VEINS OF BILATERAL LOWER EXTREMITIES WITH PAIN: ICD-10-CM

## 2025-08-21 DIAGNOSIS — R58 HEMORRHAGE OF BLOOD VESSEL: Primary | ICD-10-CM

## 2025-08-21 PROCEDURE — 1125F AMNT PAIN NOTED PAIN PRSNT: CPT | Performed by: NURSE PRACTITIONER

## 2025-08-21 PROCEDURE — 99203 OFFICE O/P NEW LOW 30 MIN: CPT | Performed by: NURSE PRACTITIONER

## 2025-08-21 PROCEDURE — 4010F ACE/ARB THERAPY RXD/TAKEN: CPT | Performed by: NURSE PRACTITIONER

## 2025-08-21 PROCEDURE — 1159F MED LIST DOCD IN RCRD: CPT | Performed by: NURSE PRACTITIONER

## 2025-08-21 PROCEDURE — 3078F DIAST BP <80 MM HG: CPT | Performed by: NURSE PRACTITIONER

## 2025-08-21 PROCEDURE — 99213 OFFICE O/P EST LOW 20 MIN: CPT | Performed by: NURSE PRACTITIONER

## 2025-08-21 PROCEDURE — 3074F SYST BP LT 130 MM HG: CPT | Performed by: NURSE PRACTITIONER

## 2025-08-21 PROCEDURE — 3008F BODY MASS INDEX DOCD: CPT | Performed by: NURSE PRACTITIONER

## 2025-08-21 ASSESSMENT — ENCOUNTER SYMPTOMS
MUSCULOSKELETAL NEGATIVE: 1
GASTROINTESTINAL NEGATIVE: 1
ROS SKIN COMMENTS: BLE VARICOSE VEINS
ENDOCRINE NEGATIVE: 1
NEUROLOGICAL NEGATIVE: 1
CONSTITUTIONAL NEGATIVE: 1
PALPITATIONS: 0
PSYCHIATRIC NEGATIVE: 1
LOSS OF SENSATION IN FEET: 0
ALLERGIC/IMMUNOLOGIC NEGATIVE: 1
OCCASIONAL FEELINGS OF UNSTEADINESS: 1
DEPRESSION: 0
RESPIRATORY NEGATIVE: 1
EYES NEGATIVE: 1
SHORTNESS OF BREATH: 0

## 2025-08-21 ASSESSMENT — COLUMBIA-SUICIDE SEVERITY RATING SCALE - C-SSRS
2. HAVE YOU ACTUALLY HAD ANY THOUGHTS OF KILLING YOURSELF?: NO
1. IN THE PAST MONTH, HAVE YOU WISHED YOU WERE DEAD OR WISHED YOU COULD GO TO SLEEP AND NOT WAKE UP?: NO
6. HAVE YOU EVER DONE ANYTHING, STARTED TO DO ANYTHING, OR PREPARED TO DO ANYTHING TO END YOUR LIFE?: NO

## 2025-08-21 ASSESSMENT — PATIENT HEALTH QUESTIONNAIRE - PHQ9
2. FEELING DOWN, DEPRESSED OR HOPELESS: NOT AT ALL
1. LITTLE INTEREST OR PLEASURE IN DOING THINGS: NOT AT ALL
SUM OF ALL RESPONSES TO PHQ9 QUESTIONS 1 AND 2: 0

## 2025-08-21 ASSESSMENT — PAIN SCALES - GENERAL: PAINLEVEL_OUTOF10: 2

## 2025-08-25 ENCOUNTER — APPOINTMENT (OUTPATIENT)
Dept: PHARMACY | Facility: HOSPITAL | Age: 70
End: 2025-08-25
Payer: MEDICARE

## 2025-08-25 DIAGNOSIS — E11.9 TYPE 2 DIABETES MELLITUS WITHOUT COMPLICATION, WITHOUT LONG-TERM CURRENT USE OF INSULIN: ICD-10-CM

## 2025-08-25 RX ORDER — TIRZEPATIDE 10 MG/.5ML
10 INJECTION, SOLUTION SUBCUTANEOUS WEEKLY
Qty: 2 ML | Refills: 11 | Status: SHIPPED | OUTPATIENT
Start: 2025-08-25

## 2025-09-02 PROCEDURE — RXMED WILLOW AMBULATORY MEDICATION CHARGE

## 2025-09-03 ENCOUNTER — PHARMACY VISIT (OUTPATIENT)
Dept: PHARMACY | Facility: CLINIC | Age: 70
End: 2025-09-03
Payer: MEDICARE

## 2025-09-22 ENCOUNTER — APPOINTMENT (OUTPATIENT)
Dept: NEUROLOGY | Facility: CLINIC | Age: 70
End: 2025-09-22
Payer: MEDICARE

## 2025-10-16 ENCOUNTER — APPOINTMENT (OUTPATIENT)
Dept: VASCULAR SURGERY | Facility: CLINIC | Age: 70
End: 2025-10-16
Payer: MEDICARE

## 2025-10-30 ENCOUNTER — APPOINTMENT (OUTPATIENT)
Dept: NEUROLOGY | Facility: CLINIC | Age: 70
End: 2025-10-30
Payer: MEDICARE

## 2025-11-10 ENCOUNTER — APPOINTMENT (OUTPATIENT)
Dept: PRIMARY CARE | Facility: CLINIC | Age: 70
End: 2025-11-10
Payer: MEDICARE